# Patient Record
Sex: FEMALE | Race: BLACK OR AFRICAN AMERICAN | NOT HISPANIC OR LATINO | Employment: FULL TIME | URBAN - METROPOLITAN AREA
[De-identification: names, ages, dates, MRNs, and addresses within clinical notes are randomized per-mention and may not be internally consistent; named-entity substitution may affect disease eponyms.]

---

## 2017-01-04 ENCOUNTER — LAB VISIT (OUTPATIENT)
Dept: LAB | Facility: HOSPITAL | Age: 32
End: 2017-01-04
Attending: OBSTETRICS & GYNECOLOGY
Payer: MEDICAID

## 2017-01-04 ENCOUNTER — ROUTINE PRENATAL (OUTPATIENT)
Dept: OBSTETRICS AND GYNECOLOGY | Facility: CLINIC | Age: 32
End: 2017-01-04
Payer: MEDICAID

## 2017-01-04 VITALS — BODY MASS INDEX: 42.13 KG/M2 | SYSTOLIC BLOOD PRESSURE: 140 MMHG | DIASTOLIC BLOOD PRESSURE: 102 MMHG | WEIGHT: 269 LBS

## 2017-01-04 DIAGNOSIS — Z3A.17 PREGNANCY WITH 17 COMPLETED WEEKS GESTATION: Primary | ICD-10-CM

## 2017-01-04 DIAGNOSIS — Z3A.17 PREGNANCY WITH 17 COMPLETED WEEKS GESTATION: ICD-10-CM

## 2017-01-04 PROCEDURE — 99999 PR PBB SHADOW E&M-EST. PATIENT-LVL I: CPT | Mod: PBBFAC,,, | Performed by: OBSTETRICS & GYNECOLOGY

## 2017-01-04 PROCEDURE — 99213 OFFICE O/P EST LOW 20 MIN: CPT | Mod: TH,S$PBB,, | Performed by: OBSTETRICS & GYNECOLOGY

## 2017-01-04 PROCEDURE — 99211 OFF/OP EST MAY X REQ PHY/QHP: CPT | Mod: PBBFAC,PN | Performed by: OBSTETRICS & GYNECOLOGY

## 2017-01-04 PROCEDURE — 81511 FTL CGEN ABNOR FOUR ANAL: CPT | Mod: PO

## 2017-01-04 PROCEDURE — 36415 COLL VENOUS BLD VENIPUNCTURE: CPT | Mod: PO

## 2017-01-04 RX ORDER — METRONIDAZOLE 500 MG/1
500 TABLET ORAL 2 TIMES DAILY
Qty: 14 TABLET | Refills: 0 | Status: SHIPPED | OUTPATIENT
Start: 2017-01-04 | End: 2017-01-11

## 2017-01-04 NOTE — PROGRESS NOTES
Has a cold, taking robitussin  Wants quad screen  Has appt to see MFM 1/18  fht's 140-50's  81mg ASA qd  rtc 1 wk for bp check  OTC meds discussed, avoid sudafed  rx for flagyl sent in

## 2017-01-06 LAB
ALPHA FETOPROTEIN MATERNAL: 30.1 NG/ML
DOWN RISK (<1:270): NORMAL
ETHNIC ORIGIN: NORMAL
GA METHOD: NORMAL
GESTATIONAL AGE (DAYS): 2
GESTATIONAL AGE (WEEKS): 17
HUMAN CHORIONIC GONADOTROPIN: 65.5 IU/ML
INHIBIN A: 136 PG/ML
INSULIN DEPEND. DIABETES: NORMAL
M.O.M. ALPHA FETOPROTEIN: 1.04
M.O.M. HCG: 2.93
M.O.M. INHIBIN A: 1.17
M.O.M. UNCONJ. ESTRIOL: 1.16
MATERNAL AGE AT EDD (YRS): 30
MATERNAL AGE FOR DOWN: NORMAL
MATERNAL WEIGHT (LBS): 269
MULTIPLE GESTATIONS: NORMAL
QUAD SCREEN INTERPRETATION: NORMAL
QUAD SCREEN: NEGATIVE
TRISOMY 18 (<1:100): NORMAL
UNCONJUGATED ESTRIOL: 1.07 NG/ML

## 2017-01-11 ENCOUNTER — ROUTINE PRENATAL (OUTPATIENT)
Dept: OBSTETRICS AND GYNECOLOGY | Facility: CLINIC | Age: 32
End: 2017-01-11
Payer: MEDICAID

## 2017-01-11 VITALS — BODY MASS INDEX: 42.13 KG/M2 | SYSTOLIC BLOOD PRESSURE: 126 MMHG | WEIGHT: 269 LBS | DIASTOLIC BLOOD PRESSURE: 82 MMHG

## 2017-01-11 DIAGNOSIS — Z3A.18 18 WEEKS GESTATION OF PREGNANCY: Primary | ICD-10-CM

## 2017-01-11 DIAGNOSIS — O16.2 HTN COMPLICATING PERIPREGNANCY, ANTEPARTUM, SECOND TRIMESTER: ICD-10-CM

## 2017-01-11 PROBLEM — O16.9 HTN COMPLICATING PERIPREGNANCY, ANTEPARTUM: Status: ACTIVE | Noted: 2017-01-11

## 2017-01-11 PROCEDURE — 99212 OFFICE O/P EST SF 10 MIN: CPT | Mod: PBBFAC,PN | Performed by: OBSTETRICS & GYNECOLOGY

## 2017-01-11 PROCEDURE — 99999 PR PBB SHADOW E&M-EST. PATIENT-LVL II: CPT | Mod: PBBFAC,,, | Performed by: OBSTETRICS & GYNECOLOGY

## 2017-01-11 PROCEDURE — 99213 OFFICE O/P EST LOW 20 MIN: CPT | Mod: TH,S$PBB,, | Performed by: OBSTETRICS & GYNECOLOGY

## 2017-01-11 NOTE — PROGRESS NOTES
Doing ok today, BP good, no complaints  Quad screen nml  Seeing MFM in 3 wks  rtc 2wks with u/s 1st

## 2017-01-25 ENCOUNTER — TELEPHONE (OUTPATIENT)
Dept: OBSTETRICS AND GYNECOLOGY | Facility: CLINIC | Age: 32
End: 2017-01-25

## 2017-01-25 ENCOUNTER — HOSPITAL ENCOUNTER (OUTPATIENT)
Dept: RADIOLOGY | Facility: HOSPITAL | Age: 32
Discharge: HOME | End: 2017-01-25
Attending: OBSTETRICS & GYNECOLOGY
Payer: MEDICAID

## 2017-01-25 ENCOUNTER — ROUTINE PRENATAL (OUTPATIENT)
Dept: OBSTETRICS AND GYNECOLOGY | Facility: CLINIC | Age: 32
End: 2017-01-25
Payer: MEDICAID

## 2017-01-25 VITALS
WEIGHT: 270.19 LBS | BODY MASS INDEX: 42.32 KG/M2 | DIASTOLIC BLOOD PRESSURE: 78 MMHG | SYSTOLIC BLOOD PRESSURE: 108 MMHG

## 2017-01-25 DIAGNOSIS — Z3A.20 20 WEEKS GESTATION OF PREGNANCY: Primary | ICD-10-CM

## 2017-01-25 PROCEDURE — 76805 OB US >/= 14 WKS SNGL FETUS: CPT | Mod: TC,PO

## 2017-01-25 PROCEDURE — 99212 OFFICE O/P EST SF 10 MIN: CPT | Mod: PBBFAC,PN | Performed by: OBSTETRICS & GYNECOLOGY

## 2017-01-25 PROCEDURE — 99213 OFFICE O/P EST LOW 20 MIN: CPT | Mod: TH,S$PBB,, | Performed by: OBSTETRICS & GYNECOLOGY

## 2017-01-25 PROCEDURE — 99999 PR PBB SHADOW E&M-EST. PATIENT-LVL II: CPT | Mod: PBBFAC,,, | Performed by: OBSTETRICS & GYNECOLOGY

## 2017-02-01 ENCOUNTER — OFFICE VISIT (OUTPATIENT)
Dept: MATERNAL FETAL MEDICINE | Facility: HOSPITAL | Age: 32
End: 2017-02-01
Payer: MEDICAID

## 2017-02-01 VITALS
BODY MASS INDEX: 42.22 KG/M2 | HEIGHT: 67 IN | DIASTOLIC BLOOD PRESSURE: 80 MMHG | SYSTOLIC BLOOD PRESSURE: 140 MMHG | WEIGHT: 269 LBS

## 2017-02-01 DIAGNOSIS — O99.212 MATERNAL MORBID OBESITY, ANTEPARTUM, SECOND TRIMESTER: ICD-10-CM

## 2017-02-01 DIAGNOSIS — O10.919 HTN IN PREGNANCY, CHRONIC: Primary | ICD-10-CM

## 2017-02-01 DIAGNOSIS — E66.01 MATERNAL MORBID OBESITY, ANTEPARTUM, SECOND TRIMESTER: ICD-10-CM

## 2017-02-01 DIAGNOSIS — O16.2 HTN COMPLICATING PERIPREGNANCY, ANTEPARTUM, SECOND TRIMESTER: ICD-10-CM

## 2017-02-01 DIAGNOSIS — O16.9 HTN COMPLICATING PERIPREGNANCY, ANTEPARTUM, UNSPECIFIED TRIMESTER: ICD-10-CM

## 2017-02-01 DIAGNOSIS — O99.891 CURRENT MATERNAL CONDITION AFFECTING PREGNANCY: ICD-10-CM

## 2017-02-01 DIAGNOSIS — Z36.3 ENCOUNTER FOR ROUTINE SCREENING FOR MALFORMATION USING ULTRASONICS: ICD-10-CM

## 2017-02-01 PROCEDURE — 99202 OFFICE O/P NEW SF 15 MIN: CPT | Mod: 25,TH,, | Performed by: OBSTETRICS & GYNECOLOGY

## 2017-02-01 PROCEDURE — 76811 OB US DETAILED SNGL FETUS: CPT

## 2017-02-01 PROCEDURE — 76811 OB US DETAILED SNGL FETUS: CPT | Mod: 26,,, | Performed by: OBSTETRICS & GYNECOLOGY

## 2017-02-01 RX ORDER — NAPROXEN SODIUM 220 MG/1
81 TABLET, FILM COATED ORAL DAILY
COMMUNITY
End: 2017-06-09

## 2017-02-01 NOTE — PROGRESS NOTES
Chief complaint: ? Hypertension in pregnancy    Provider requesting consultation: Davis Timbo    31 y.o.  at 21w 2d gestation     Wt 269 lbs (BMI 42)  /80 mmhg    Prenatal visits with Dr. Izquierdo prior to 20 wks gestation, she had at least 2 BP's that met the criteria for HTN:      BPs since first visit to 20w 2d were as follows: 146/96, 140/80, 114/82, 140/82, 126/82, 108/78    Labs:  2016: Cr 0.6  2016: 24 hr urine for protein      Inadequate volume of 800cc      Protein 64mg    Ob hx:  1. , term, male, 8 lbs 9oz, LTCS  2. , SAB  3. , EAB    Quad screen negative     Meds:  ASA 81 mg daily    Social history: reports that she has never smoked. She has never used smokeless tobacco. She reports that she drinks alcohol. She reports that she does not use illicit drugs.    Detailed/Targeted fetal anatomical survey - ultrasound: see report for details   Some views are suboptimal secondary to fetal positioning and decreased resolution however no obvious abnormalities are detected   Reassuring fetal growth  Normal amniotic fluid volume per qualitative assessment  Normal placental location without evidence of previa  Normal appearing cervical length per trans-abdominal screening    ----------technically difficul sono--------------    Counseling:  Patient counseled that based on her above mentioned prenatal visit blood pressures, she has the diagnosis of CHTN. Therefore, I counseled patient on CHTN in pregnancy as noted below.     Counseling:    Hypertension in pregnancy counseling and recommendations:  Today I counseled the patient on maternal/fetal risks associated with CHTN during pregnancy including fetal growth restriction, miscarriage,  delivery, development of superimposed preeclampsia, and eclampsia. She was counseled on the recommendations for blood pressure control, serial ultrasound for fetal growth assessment, and timing of delivery. I also counseled her on the  recommendation for aspirin 81 mg daily which may decrease her risk of developing superimposed preeclampsia.  Patient is already taking ASA 81mg po qday.     I spent about 20 minutes in consultation separate from ultrasound    Recommendations:  1. We will schedule patient for a f/u sono with MFM in 4-5 wks for f/u anatomy, interval growth, and amniotic fluid volume assessment     2. HTN in pregnancy: [facility] recommendations   - Because of the increased risk of preeclampsia in patients with underlying chronic hypertension we recommend starting aspirin 81mg daily beginning in the late first trimester.           -patient already taking ASA     -Because of the increased risk of preeclampsia in patients with chronic hypertension we recommend obtaining a baseline 24 hour urine protein evaluation or a baseline urine protein to creatinine ratio.             -patient had a 24 hr urine on 11- with a volume of 800 (mildly inadequate). Consider obtaining a Protein/Creatinine ratio for a baseline     -Secondary to the higher incidence of intrauterine growth restriction (IUGR) in patients with chronic hypertension, we recommend periodic (every 4-6 weeks) fetal growth assessments.               -We will be seeing patient in 4-5 wks for a f/u sono to complete fetal anatomical survey.     -Continued close observation of patient's blood pressures.  Care should be taken also to avoid hypotension in pregnancy as this can be associated with uteroplacental insufficiency.  Currently we recommend:   -For women with a systolic BP less than 160mmHg or a diastolic BP less than 105mmHg, and no evidence of end organ damage, no medication treatment needed   -For women with a systolic BP persistently greater than or equal to 160mmHg or a diastolic BP greater than or equal to 105mmHg, antihypertensive medication is                     recommended with goal to be between 120-160mmHg systolic and 80-105mmHg diastolic.     -We also  recommend fetal surveillance for women with chronic hypertension that are requiring medications.  We recommend twice weekly fetal NSTs with once weekly MVP starting at 32 weeks until delivery.    -In regards to timing of delivery we recommend to follow the guidelines from ACOG Committee Opinion Number 560 from April of 2013.  The committee opinion recommends to consider delivery in the following:    -Women on no medications: at 38 0/7 - 39 6/7 weeks EGA  -Women that are well controlled on medications: at 37 0/7 - 39 6/7 weeks EGA  -Women that are difficult to control on medications: at 36 0/7 - 37 6/7 weeks EGA.    Please do note hesitate to contact me if you have any questions.

## 2017-02-20 ENCOUNTER — LAB VISIT (OUTPATIENT)
Dept: LAB | Facility: HOSPITAL | Age: 32
End: 2017-02-20
Attending: OBSTETRICS & GYNECOLOGY
Payer: MEDICAID

## 2017-02-20 DIAGNOSIS — Z3A.20 20 WEEKS GESTATION OF PREGNANCY: ICD-10-CM

## 2017-02-20 LAB — GLUCOSE SERPL-MCNC: 99 MG/DL

## 2017-02-20 PROCEDURE — 82950 GLUCOSE TEST: CPT | Mod: PO

## 2017-02-20 PROCEDURE — 36415 COLL VENOUS BLD VENIPUNCTURE: CPT | Mod: PO

## 2017-02-22 ENCOUNTER — ROUTINE PRENATAL (OUTPATIENT)
Dept: OBSTETRICS AND GYNECOLOGY | Facility: CLINIC | Age: 32
End: 2017-02-22
Payer: MEDICAID

## 2017-02-22 VITALS — BODY MASS INDEX: 43.23 KG/M2 | SYSTOLIC BLOOD PRESSURE: 128 MMHG | WEIGHT: 276 LBS | DIASTOLIC BLOOD PRESSURE: 74 MMHG

## 2017-02-22 PROCEDURE — 99213 OFFICE O/P EST LOW 20 MIN: CPT | Mod: TH,S$PBB,, | Performed by: OBSTETRICS & GYNECOLOGY

## 2017-02-22 PROCEDURE — 99999 PR PBB SHADOW E&M-EST. PATIENT-LVL I: CPT | Mod: PBBFAC,,, | Performed by: OBSTETRICS & GYNECOLOGY

## 2017-02-22 PROCEDURE — 99211 OFF/OP EST MAY X REQ PHY/QHP: CPT | Mod: PBBFAC,PN | Performed by: OBSTETRICS & GYNECOLOGY

## 2017-03-08 ENCOUNTER — OFFICE VISIT (OUTPATIENT)
Dept: MATERNAL FETAL MEDICINE | Facility: HOSPITAL | Age: 32
End: 2017-03-08
Payer: MEDICAID

## 2017-03-08 DIAGNOSIS — O10.919 HTN IN PREGNANCY, CHRONIC: ICD-10-CM

## 2017-03-08 DIAGNOSIS — Z36.4 ANTENATAL SCREENING FOR FETAL GROWTH RETARDATION USING ULTRASONICS: ICD-10-CM

## 2017-03-08 DIAGNOSIS — O99.891 CURRENT MATERNAL CONDITION AFFECTING PREGNANCY: ICD-10-CM

## 2017-03-08 DIAGNOSIS — O16.2 HTN COMPLICATING PERIPREGNANCY, ANTEPARTUM, SECOND TRIMESTER: ICD-10-CM

## 2017-03-08 PROCEDURE — 76816 OB US FOLLOW-UP PER FETUS: CPT | Mod: 26,,, | Performed by: OBSTETRICS & GYNECOLOGY

## 2017-03-08 PROCEDURE — 76816 OB US FOLLOW-UP PER FETUS: CPT

## 2017-03-08 PROCEDURE — 99499 UNLISTED E&M SERVICE: CPT | Mod: ,,, | Performed by: OBSTETRICS & GYNECOLOGY

## 2017-03-09 ENCOUNTER — TELEPHONE (OUTPATIENT)
Dept: MATERNAL FETAL MEDICINE | Facility: CLINIC | Age: 32
End: 2017-03-09

## 2017-03-09 DIAGNOSIS — O99.210 OBESITY AFFECTING PREGNANCY: Primary | ICD-10-CM

## 2017-03-09 NOTE — TELEPHONE ENCOUNTER
----- Message from Meliza Easley LPN sent at 3/8/2017 10:42 AM CST -----  Please schedule pt for 4 week follow up with [facility]alo and Peds cards for the same day.     Thank you.       Scheduled.  Left patient a message to return my call at [phone].

## 2017-03-12 NOTE — PROGRESS NOTES
"Indication  ========    CHTN on ASA, BMI 43: f/u anatomy, interval growth.    History  ======    General History  Height 170 cm  Height (ft) 5 ft  Height (in) 7 in  Risk Factors  History risk factors: Personal history of hypertension    Pregnancy History  ==============    Maternal Lab Tests  Test: Quad/ Penta Screen  Date: 1/4/2017  Result: negative  Wants to know gender: yes    Maternal Assessment  =================    Weight 123 kg  Weight (lb) 272 lb  Height 170 cm  Height (ft) 5 ft  Height (in) 7 in  BP syst 130 mmHg  BP diast 70 mmHg  BMI 42.60 kg/m²    Method  ======    Transabdominal ultrasound examination. View: Good view.    Pregnancy  =========    Goins pregnancy. Number of fetuses: 1.    Dating  ======    GA by "stated dating" 26 w + 2 d  RAUL by "stated dating": 6/12/2017  Ultrasound examination on: 3/8/2017  GA by U/S based upon: AC, BPD, Femur, HC  GA by U/S 27 w + 2 d  RAUL by U/S: 6/5/2017  Assigned: The Best Overall Assessment is based on the stated EDC.  Assigned GA 26 w + 2 d  Assigned RAUL: 6/12/2017    General Evaluation  ==============    Cardiac activity: present.  bpm.  Fetal movements: visualized.  Presentation: cephalic.  Placenta:  Placental site: posterior.  Umbilical cord: Cord vessels: 3 vessel cord.  Amniotic fluid: MVP 7.1 cm.    Biophysical Profile  ==============    2: Amniotic fluid volume    Fetal Biometry  ============    Fetal Biometry  BPD 69.4 mm 88% 27w 6d Hadlock  .8 mm 78% 27w 6d Hadlock  .6 mm 45% 26w 3d Hadlock  Femur 49.7 mm 51% 26w 5d Hadlock  Cerebellum tr 29.8 mm 60% 27w 2d Lopez  CM 6.5 mm 54% Nicolaides   g 58% 26w 3d Hadlock  Calculated by: Hadlock (BPD-HC-AC-FL)  EFW (lb) 2 lb  EFW (oz) 3 oz  HC / AC 1.17  FL / BPD 0.72  FL / AC 0.23  MVP 7.1 cm   bpm    Fetal Anatomy  ===========    Cranium: normal  Midline falx: normal  Cavum septi pellucidi: normal  Cerebellum: normal  Cisterna magna: normal  Lt lateral " ventricle: normal  Neck: suboptimal  Lips: documented previously  Profile: normal  Nose: documented previously  4-chamber view: normal  RVOT: normal  LVOT: normal  Heart / Thorax: Septum normal  Situs: normal  Aortic arch: suboptimal  Ductal arch: suboptimal  Diaphragm: normal  Cord insertion: normal  Stomach: normal  Kidneys: normal  Bladder: normal  Genitals: normal  Cervical spine: normal  Thoracic spine: normal  Lumbar spine: normal  Sacral spine: normal  Rt arm: documented previously  Lt arm: documented previously  Rt hand: sub opt today prev seen in resting position  Lt hand: documented previously  Rt leg: documented previously  Lt leg: documented previously  Rt foot: documented previously  Lt foot: documented previously  Gender: male  Wants to know gender: yes    Consultation  ==========    31 y.o.  at 26w 2d gestation    Wt 272 lbs (BMI 42)  /70 mmhg    Prenatal visits with Dr. Izquierdo prior to 20 wks gestation, she had at least 2 BP's that met the criteria for HTN:  BPs since first visit to 20w 2d were as follows: 146/96, 140/80, 114/82, 140/82, 126/82, 108/78    Labs:  2016: Cr 0.6  2016: 24 hr urine for protein  Inadequate volume of 800cc  Protein 64mg    Ob hx:  1. , term, male, 8 lbs 9oz, LTCS  2. , SAB  3. , EAB    Quad screen negative    Meds:  ASA 81 mg daily    Social history: reports that she has never smoked. She has never used smokeless tobacco. She reports that she drinks alcohol. She reports  that she does not use illicit drugs.    Today, patient without complaints  ultrasound performed: see report for details  Patient counseled on today's ultrasound and recommendations for f/u sono with MFM in 4 wks for fetal growth, and referral to Peds Cards for  fetal echocardiogram secondary to technically difficult sono (BMI 43).    Impression  =========    Normal fetal anatomy with no obvious abnormalities: see above for details of those organs  suboptimum  Reassuring fetal growth  Normal amniotic fluid volume (MVP 7.1cm)  Normal placental location without evidence of previa  .    Recommendation  ==============    1. We will schedule patient for a f/u sono with MFM in 4 wks for interval fetal growth, MVP, and overall fetal well being    2. We will schedule patient for fetal echocardiogram with Peds Cards secondary to technically difficult sono (BMI 43)    3. Refer to prior sono/consult on 02- for prior recommendations pertaining to CHTN .

## 2017-03-15 ENCOUNTER — ROUTINE PRENATAL (OUTPATIENT)
Dept: OBSTETRICS AND GYNECOLOGY | Facility: CLINIC | Age: 32
End: 2017-03-15
Payer: MEDICAID

## 2017-03-15 VITALS — WEIGHT: 279 LBS | BODY MASS INDEX: 43.7 KG/M2 | SYSTOLIC BLOOD PRESSURE: 130 MMHG | DIASTOLIC BLOOD PRESSURE: 84 MMHG

## 2017-03-15 DIAGNOSIS — Z3A.27 27 WEEKS GESTATION OF PREGNANCY: Primary | ICD-10-CM

## 2017-03-15 PROCEDURE — 99211 OFF/OP EST MAY X REQ PHY/QHP: CPT | Mod: PBBFAC,PN | Performed by: OBSTETRICS & GYNECOLOGY

## 2017-03-15 PROCEDURE — 99213 OFFICE O/P EST LOW 20 MIN: CPT | Mod: TH,S$PBB,, | Performed by: OBSTETRICS & GYNECOLOGY

## 2017-03-15 PROCEDURE — 99999 PR PBB SHADOW E&M-EST. PATIENT-LVL I: CPT | Mod: PBBFAC,,, | Performed by: OBSTETRICS & GYNECOLOGY

## 2017-03-31 ENCOUNTER — LAB VISIT (OUTPATIENT)
Dept: LAB | Facility: HOSPITAL | Age: 32
End: 2017-03-31
Attending: OBSTETRICS & GYNECOLOGY
Payer: MEDICAID

## 2017-03-31 DIAGNOSIS — Z3A.27 27 WEEKS GESTATION OF PREGNANCY: ICD-10-CM

## 2017-03-31 LAB — GLUCOSE SERPL-MCNC: 121 MG/DL

## 2017-03-31 PROCEDURE — 82950 GLUCOSE TEST: CPT | Mod: PO

## 2017-03-31 PROCEDURE — 36415 COLL VENOUS BLD VENIPUNCTURE: CPT | Mod: PO

## 2017-04-06 ENCOUNTER — OFFICE VISIT (OUTPATIENT)
Dept: MATERNAL FETAL MEDICINE | Facility: CLINIC | Age: 32
End: 2017-04-06
Attending: OBSTETRICS & GYNECOLOGY
Payer: MEDICAID

## 2017-04-06 ENCOUNTER — OFFICE VISIT (OUTPATIENT)
Dept: PEDIATRIC CARDIOLOGY | Facility: CLINIC | Age: 32
End: 2017-04-06
Attending: PEDIATRICS
Payer: MEDICAID

## 2017-04-06 VITALS
HEIGHT: 67 IN | BODY MASS INDEX: 43.95 KG/M2 | SYSTOLIC BLOOD PRESSURE: 116 MMHG | DIASTOLIC BLOOD PRESSURE: 81 MMHG | WEIGHT: 280 LBS | HEART RATE: 93 BPM

## 2017-04-06 DIAGNOSIS — O99.210 OBESITY AFFECTING PREGNANCY: ICD-10-CM

## 2017-04-06 DIAGNOSIS — O10.919 HTN IN PREGNANCY, CHRONIC: ICD-10-CM

## 2017-04-06 DIAGNOSIS — O16.3 HTN COMPLICATING PERIPREGNANCY, ANTEPARTUM, THIRD TRIMESTER: Primary | ICD-10-CM

## 2017-04-06 PROCEDURE — 99499 UNLISTED E&M SERVICE: CPT | Mod: S$PBB,,, | Performed by: OBSTETRICS & GYNECOLOGY

## 2017-04-06 PROCEDURE — 76825 ECHO EXAM OF FETAL HEART: CPT | Mod: PBBFAC | Performed by: PEDIATRICS

## 2017-04-06 PROCEDURE — 76827 ECHO EXAM OF FETAL HEART: CPT | Mod: PBBFAC | Performed by: PEDIATRICS

## 2017-04-06 PROCEDURE — 76816 OB US FOLLOW-UP PER FETUS: CPT | Mod: 26,S$PBB,, | Performed by: OBSTETRICS & GYNECOLOGY

## 2017-04-06 PROCEDURE — 76819 FETAL BIOPHYS PROFIL W/O NST: CPT | Mod: 26,S$PBB,, | Performed by: OBSTETRICS & GYNECOLOGY

## 2017-04-06 PROCEDURE — 76819 FETAL BIOPHYS PROFIL W/O NST: CPT | Mod: PBBFAC | Performed by: OBSTETRICS & GYNECOLOGY

## 2017-04-06 PROCEDURE — 76816 OB US FOLLOW-UP PER FETUS: CPT | Mod: PBBFAC | Performed by: OBSTETRICS & GYNECOLOGY

## 2017-04-06 PROCEDURE — 76825 ECHO EXAM OF FETAL HEART: CPT | Mod: 26,S$PBB,, | Performed by: PEDIATRICS

## 2017-04-06 PROCEDURE — 76827 ECHO EXAM OF FETAL HEART: CPT | Mod: 26,S$PBB,, | Performed by: PEDIATRICS

## 2017-04-06 PROCEDURE — 99203 OFFICE O/P NEW LOW 30 MIN: CPT | Mod: 25,S$PBB,, | Performed by: PEDIATRICS

## 2017-04-06 PROCEDURE — 93325 DOPPLER ECHO COLOR FLOW MAPG: CPT | Mod: 26,S$PBB,, | Performed by: PEDIATRICS

## 2017-04-06 PROCEDURE — 99999 PR PBB SHADOW E&M-EST. PATIENT-LVL III: CPT | Mod: PBBFAC,,, | Performed by: PEDIATRICS

## 2017-04-06 PROCEDURE — 93325 DOPPLER ECHO COLOR FLOW MAPG: CPT | Mod: PBBFAC | Performed by: PEDIATRICS

## 2017-04-06 NOTE — LETTER
April 7, 2017      Yoli Sánchez MD  [address]  [facility] [address]           [facility]  [address]  [facility] [address]  Phone: [phone]  Fax: [phone]          Patient: Joseph Duenas   MR Number: [id number]   YOB: 1985   Date of Visit: 4/6/2017       Dear Dr. Yoli Sánchez:    Thank you for referring Joseph Duenas to me for evaluation. Attached you will find relevant portions of my assessment and plan of care.    If you have questions, please do not hesitate to call me. I look forward to following Joseph Duenas along with you.    Sincerely,    Mateo Gutierrez MD    Enclosure  CC:  No Recipients    If you would like to receive this communication electronically, please contact [email or web address] or [phone] to request more information on [employer] Link access.    For providers and/or their staff who would like to refer a patient to [facility], please contact us through our one-stop-shop provider referral line, [facility], at [phone].    If you feel you have received this communication in error or would no longer like to receive these types of communications, please e-mail [email or web address]

## 2017-04-06 NOTE — PROGRESS NOTES
See ultrasound report for details    BP stable without meds  Fetal growth and surveillance is reassuring,   Normal Amniotic fluid volume

## 2017-04-06 NOTE — LETTER
April 6, 2017      Yoli Sánchez MD  [address]  [facility] [address]           [facility]  [address]  [place] [address]  Phone: [phone]          Patient: Joseph Duenas   MR Number: [id number]   YOB: 1985   Date of Visit: 4/6/2017       Dear Dr. Yoli Sánchez:    Thank you for referring Joseph Duenas to me for evaluation. Attached you will find relevant portions of my assessment and plan of care.    If you have questions, please do not hesitate to call me. I look forward to following Joseph Duenas along with you.    Sincerely,    Anders Qiu III, MD    Enclosure  CC:  No Recipients    If you would like to receive this communication electronically, please contact [email or web address] or [phone] to request more information on [employer] Link access.    For providers and/or their staff who would like to refer a patient to [facility], please contact us through our one-stop-shop provider referral line, [facility], at [phone].    If you feel you have received this communication in error or would no longer like to receive these types of communications, please e-mail [email or web address]

## 2017-04-07 NOTE — PROGRESS NOTES
"Ms. Duenas  is a 31 y.o. year old  , referred by  because of difficult imaging of the fetal heart.    The patient presented at approximately 30 3/7 weeks gestation (Patient's last menstrual period was 2016 (approximate).).  The patient denied any complaints.    Past medical history: Significant for hypertension (currently off medication).  Past surgical history: S/P C/S x 1..  Past gestational history:   1. , term, male, 8 lbs 9oz, LTCS  2. , SAB  3. , EAB    Family history: Negative for congenital heart disease, and sudden death during childhood.    Medications:   Outpatient Encounter Prescriptions as of 2017   Medication Sig Dispense Refill    aspirin 81 MG Chew Take 81 mg by mouth once daily.       No facility-administered encounter medications on file as of 2017.        Allergies: Review of patient's allergies indicates no known allergies.    Blood pressure 116/81, pulse 93, height 5' 7.01" (1.702 m), weight 127 kg (279 lb 15.8 oz), last menstrual period 2016.    Fetal echocardiogram was technically difficult due to advanced gestation, patient size and fetal position.  A four chamber fetal heart with situs solitus was seen.  The ventricles appeared to be equal in size.  The contractility of both ventricles was good.  The fetal heart rate was within the normal range, and regular.  The interventricular septum appeared to be intact.  There were normally related great arteries seen.  The ductal and aortic arch were visualized, and appeared to be widely patent.  There was no pleural or pericardial effusion seen.    Doppler analysis revealed a three vessel umbilical cord, with normal flow patterns, and velocities by Doppler.  There was a normal flow pattern seen in the ductus venosus.  There was evidence of normal systemic, and pulmonary venous return seen.  There were normal inflow patterns seen across the AV-valves, without significant insufficiency.  There was no " ventricular level shunt seen.  The right and left ventricular outflow tract, and ductal and aortic arch appeared to be unobstructed.    Impression:  Although this was a technically difficult study, it is our impression that Ms. Duenas had a normal fetal echocardiogram.  As you know, small defects, and coarctation of the aorta cannot always be ruled out on fetal echocardiogram.  We discussed our findings with the patient, reviewed our images, and answered her questions. We also discussed the limitations of fetal echocardiography, but emphasized that her child appears to have normal cardiac anatomy.  No further follow up is scheduled in our clinic, but, of course, we will always be available to reevaluate this patient, if needed.  Time spent: 30 minutes, 50% dedicated to counseling.

## 2017-04-10 ENCOUNTER — ROUTINE PRENATAL (OUTPATIENT)
Dept: OBSTETRICS AND GYNECOLOGY | Facility: CLINIC | Age: 32
End: 2017-04-10
Payer: MEDICAID

## 2017-04-10 VITALS — BODY MASS INDEX: 44 KG/M2 | SYSTOLIC BLOOD PRESSURE: 122 MMHG | WEIGHT: 281 LBS | DIASTOLIC BLOOD PRESSURE: 84 MMHG

## 2017-04-10 DIAGNOSIS — Z3A.31 31 WEEKS GESTATION OF PREGNANCY: Primary | ICD-10-CM

## 2017-04-10 PROCEDURE — 99213 OFFICE O/P EST LOW 20 MIN: CPT | Mod: TH,S$PBB,, | Performed by: OBSTETRICS & GYNECOLOGY

## 2017-04-10 PROCEDURE — 99211 OFF/OP EST MAY X REQ PHY/QHP: CPT | Mod: PBBFAC,PN | Performed by: OBSTETRICS & GYNECOLOGY

## 2017-04-10 PROCEDURE — 99999 PR PBB SHADOW E&M-EST. PATIENT-LVL I: CPT | Mod: PBBFAC,,, | Performed by: OBSTETRICS & GYNECOLOGY

## 2017-04-10 NOTE — PROGRESS NOTES
32 y/o AAF @31weeks here for f/u  Seen by pediatric card's- echo reveals normal fetal cardiac anatomy  BP within acceptably range today  Reports abd cramping which resolved on its own  FHT's 140's  Denies vaginal d/c, bleeding   Counseled on PTL, VB, ROM

## 2017-04-24 ENCOUNTER — ROUTINE PRENATAL (OUTPATIENT)
Dept: OBSTETRICS AND GYNECOLOGY | Facility: CLINIC | Age: 32
End: 2017-04-24
Payer: MEDICAID

## 2017-04-24 VITALS — SYSTOLIC BLOOD PRESSURE: 128 MMHG | WEIGHT: 283 LBS | BODY MASS INDEX: 44.31 KG/M2 | DIASTOLIC BLOOD PRESSURE: 86 MMHG

## 2017-04-24 DIAGNOSIS — Z3A.33 33 WEEKS GESTATION OF PREGNANCY: Primary | ICD-10-CM

## 2017-04-24 PROCEDURE — 99211 OFF/OP EST MAY X REQ PHY/QHP: CPT | Mod: PBBFAC,PN | Performed by: OBSTETRICS & GYNECOLOGY

## 2017-04-24 PROCEDURE — 99999 PR PBB SHADOW E&M-EST. PATIENT-LVL I: CPT | Mod: PBBFAC,,, | Performed by: OBSTETRICS & GYNECOLOGY

## 2017-04-24 PROCEDURE — 99213 OFFICE O/P EST LOW 20 MIN: CPT | Mod: TH,S$PBB,, | Performed by: OBSTETRICS & GYNECOLOGY

## 2017-05-03 ENCOUNTER — OFFICE VISIT (OUTPATIENT)
Dept: MATERNAL FETAL MEDICINE | Facility: HOSPITAL | Age: 32
End: 2017-05-03
Attending: OBSTETRICS & GYNECOLOGY
Payer: MEDICAID

## 2017-05-03 VITALS
BODY MASS INDEX: 44 KG/M2 | DIASTOLIC BLOOD PRESSURE: 70 MMHG | SYSTOLIC BLOOD PRESSURE: 136 MMHG | HEART RATE: 110 BPM | WEIGHT: 281 LBS

## 2017-05-03 DIAGNOSIS — O99.891 CURRENT MATERNAL CONDITION AFFECTING PREGNANCY: ICD-10-CM

## 2017-05-03 DIAGNOSIS — O10.919 HTN IN PREGNANCY, CHRONIC: ICD-10-CM

## 2017-05-03 DIAGNOSIS — Z36.4 ANTENATAL SCREENING FOR FETAL GROWTH RETARDATION USING ULTRASONICS: ICD-10-CM

## 2017-05-03 PROCEDURE — 76816 OB US FOLLOW-UP PER FETUS: CPT

## 2017-05-03 PROCEDURE — 76819 FETAL BIOPHYS PROFIL W/O NST: CPT | Mod: 26,S$PBB,, | Performed by: OBSTETRICS & GYNECOLOGY

## 2017-05-03 PROCEDURE — 99499 UNLISTED E&M SERVICE: CPT | Mod: ,,, | Performed by: OBSTETRICS & GYNECOLOGY

## 2017-05-03 PROCEDURE — 76816 OB US FOLLOW-UP PER FETUS: CPT | Mod: 26,S$PBB,, | Performed by: OBSTETRICS & GYNECOLOGY

## 2017-05-03 NOTE — PROGRESS NOTES
"Indication  ========    Evaluation of fetal growth, MVP and BPP without NST (Dr. Izquierdo).    History  ======    General History  Height 170 cm  Height (ft) 5 ft  Height (in) 7 in  Risk Factors  History risk factors: Personal history of hypertension    Pregnancy History  ==============    Maternal Lab Tests  Test: Quad/ Penta Screen  Date: 1/4/2017  Result: negative  Wants to know gender: yes    Maternal Assessment  =================    Weight 127 kg  Weight (lb) 281 lb  Height 170 cm  Height (ft) 5 ft  Height (in) 7 in  BP syst 136 mmHg  BP diast 70 mmHg  BMI 44.01 kg/m²    Method  ======    Transabdominal ultrasound examination. View: Good view.    Pregnancy  =========    Goins pregnancy. Number of fetuses: 1.    Dating  ======    Cycle: regular cycle  GA by "stated dating" 34 w + 2 d  RAUL by "stated dating": 6/12/2017  Ultrasound examination on: 5/3/2017  GA by U/S based upon: AC, BPD, Femur, HC  GA by U/S 35 w + 3 d  RAUL by U/S: 6/4/2017  Assigned: The Best Overall Assessment is based on the stated EDC.  Assigned GA 34 w + 2 d  Assigned RAUL: 6/12/2017    General Evaluation  ==============    Cardiac activity: present.  bpm.  Fetal movements: visualized.  Presentation: cephalic.  Placenta:  Placental site: posterior.  Umbilical cord: Cord vessels: 3 vessel cord.  Amniotic fluid: Amount of AF: normal amount. MVP 7.2 cm.    Biophysical Profile  ==============    2: Fetal breathing movements  2: Gross body movements  2: Fetal tone  2: Amniotic fluid volume  8/8: Biophysical profile score    Fetal Biometry  ============    Fetal Biometry  BPD 88.2 mm 35w 5d Hadlock  .7 mm 36w 2d Hadlock  .1 mm 36w 3d Hadlock  Femur 64.2 mm 33w 1d Hadlock  EFW 2,693 g 78% 35w 3d Hadlock  Calculated by: Hadlock (BPD-HC-AC-FL)  EFW (lb) 5 lb  EFW (oz) 15 oz  HC / AC 0.99  FL / BPD 0.73  FL / AC 0.20  MVP 7.2 cm   bpm    Fetal Anatomy  ===========    Cranium: normal  Lateral ventricles: documented " previously  Choroid plexus: documented previously  Midline falx: documented previously  Cavum septi pellucidi: documented previously  Cerebellum: documented previously  Cisterna magna: documented previously  Lips: normal  Profile: documented previously  Nose: normal  4-chamber view: documented previously  RVOT: documented previously  LVOT: documented previously  Heart / Thorax: septum is sub opt today prev seen wnl; Peds Cards fetal echocardiogram (04/06/17): technically difficult, wnl  Aortic arch: documented previously  Ductal arch: suboptimal  Diaphragm: normal  Cord insertion: documented previously  Stomach: normal  Kidneys: normal  Bladder: normal  Genitals: normal  Cervical spine: normal  Thoracic spine: normal  Lumbar spine: normal  Sacral spine: normal  Arms: documented previously  Legs: documented previously  Gender: male  Wants to know gender: yes    Impression  =========    1. 34w 2d lal pregnancy  2. Normal interval fetal growth (EFW = 2693 gms at 78%)  3. BPP without NST = 8/8  4. Amniotic fluid volume wnl (MVP 7.2cm)    Patient counseled on sono evaluation and recommendations .    Recommendation  ==============    1. We recommend a f/u sono in 4 wks for interval fetal growth, MVP, and BPP without NST  this sono will be performed in your office and reconsult MFM as needed    2. HTN in pregnancy  -Continued close observation of patient's blood pressures. Care should be taken also to avoid hypotension in pregnancy as this can be  associated with uteroplacental insufficiency. Currently we recommend:  -For women with a systolic BP less than 160mmHg or a diastolic BP less than 105mmHg, and no  evidence of end organ damage, no medication treatment needed    -For women with a systolic BP persistently greater than or equal to 160mmHg or a diastolic BP  greater than or equal to 105mmHg, antihypertensive medication is recommended with goal to be between  120-160mmHg systolic and 80-105mmHg diastolic.    -We  also recommend fetal surveillance for women with chronic hypertension that are requiring medications. We recommend twice weekly fetal  NSTs with once weekly MVP starting at 32 weeks until delivery.    -In regards to timing of delivery we recommend to follow the guidelines from ACOG Committee Opinion Number 560 from April of 2013. The  committee opinion recommends to consider delivery in the following:  -Women on no medications: at 38 0/7 - 39 6/7 weeks EGA  -Women that are well controlled on medications: at 37 0/7 - 39 6/7 weeks EGA  -Women that are difficult to control on medications: at 36 0/7 - 37 6/7 weeks EGA.    Please do note hesitate to contact me if you have any questions.

## 2017-05-03 NOTE — LETTER
May 3, 2017      Yoli Sánchez MD  [address]  [facility] [address]           [facility]  [address] [place] [address] First Floor Diagnostics  [place] [address]          Patient: Joseph Duenas   MR Number: [id number]   YOB: 1985   Date of Visit: 5/3/2017       Dear Dr. Yoli Sánchez:    Thank you for referring Joseph Duenas to me for evaluation. Attached you will find relevant portions of my assessment and plan of care.    If you have questions, please do not hesitate to call me. I look forward to following Joseph Duenas along with you.    Sincerely,    Yoli Sánchez MD    Enclosure  CC:  No Recipients    If you would like to receive this communication electronically, please contact [email or web address] or [phone] to request more information on [employer] Link access.    For providers and/or their staff who would like to refer a patient to [facility], please contact us through our one-stop-shop provider referral line, [facility] , at [phone].    If you feel you have received this communication in error or would no longer like to receive these types of communications, please e-mail [email or web address]

## 2017-05-10 ENCOUNTER — ROUTINE PRENATAL (OUTPATIENT)
Dept: OBSTETRICS AND GYNECOLOGY | Facility: CLINIC | Age: 32
End: 2017-05-10
Payer: MEDICAID

## 2017-05-10 VITALS — DIASTOLIC BLOOD PRESSURE: 74 MMHG | BODY MASS INDEX: 44.47 KG/M2 | WEIGHT: 284 LBS | SYSTOLIC BLOOD PRESSURE: 126 MMHG

## 2017-05-10 DIAGNOSIS — Z3A.35 35 WEEKS GESTATION OF PREGNANCY: Primary | ICD-10-CM

## 2017-05-10 PROCEDURE — 99212 OFFICE O/P EST SF 10 MIN: CPT | Mod: PBBFAC,PN | Performed by: OBSTETRICS & GYNECOLOGY

## 2017-05-10 PROCEDURE — 99999 PR PBB SHADOW E&M-EST. PATIENT-LVL II: CPT | Mod: PBBFAC,,, | Performed by: OBSTETRICS & GYNECOLOGY

## 2017-05-10 PROCEDURE — 99213 OFFICE O/P EST LOW 20 MIN: CPT | Mod: TH,S$PBB,, | Performed by: OBSTETRICS & GYNECOLOGY

## 2017-05-10 PROCEDURE — 87081 CULTURE SCREEN ONLY: CPT

## 2017-05-12 LAB — BACTERIA SPEC AEROBE CULT: NORMAL

## 2017-05-17 ENCOUNTER — ROUTINE PRENATAL (OUTPATIENT)
Dept: OBSTETRICS AND GYNECOLOGY | Facility: CLINIC | Age: 32
End: 2017-05-17
Payer: MEDICAID

## 2017-05-17 VITALS — DIASTOLIC BLOOD PRESSURE: 82 MMHG | BODY MASS INDEX: 45.25 KG/M2 | SYSTOLIC BLOOD PRESSURE: 124 MMHG | WEIGHT: 289 LBS

## 2017-05-17 DIAGNOSIS — Z3A.36 36 WEEKS GESTATION OF PREGNANCY: ICD-10-CM

## 2017-05-17 DIAGNOSIS — Z3A.39 39 WEEKS GESTATION OF PREGNANCY: Primary | ICD-10-CM

## 2017-05-17 PROCEDURE — 99213 OFFICE O/P EST LOW 20 MIN: CPT | Mod: TH,S$PBB,, | Performed by: OBSTETRICS & GYNECOLOGY

## 2017-05-17 PROCEDURE — 99213 OFFICE O/P EST LOW 20 MIN: CPT | Mod: PBBFAC,PN | Performed by: OBSTETRICS & GYNECOLOGY

## 2017-05-17 PROCEDURE — 99999 PR PBB SHADOW E&M-EST. PATIENT-LVL III: CPT | Mod: PBBFAC,,, | Performed by: OBSTETRICS & GYNECOLOGY

## 2017-05-17 RX ORDER — CARBOPROST TROMETHAMINE 250 UG/ML
250 INJECTION, SOLUTION INTRAMUSCULAR
Status: CANCELLED | OUTPATIENT
Start: 2017-05-17

## 2017-05-17 RX ORDER — MISOPROSTOL 100 UG/1
800 TABLET ORAL
Status: CANCELLED | OUTPATIENT
Start: 2017-05-17

## 2017-05-17 RX ORDER — METHYLERGONOVINE MALEATE 0.2 MG/ML
200 INJECTION INTRAVENOUS
Status: CANCELLED | OUTPATIENT
Start: 2017-05-17

## 2017-05-17 RX ORDER — SODIUM CHLORIDE, SODIUM LACTATE, POTASSIUM CHLORIDE, CALCIUM CHLORIDE 600; 310; 30; 20 MG/100ML; MG/100ML; MG/100ML; MG/100ML
INJECTION, SOLUTION INTRAVENOUS CONTINUOUS
Status: CANCELLED | OUTPATIENT
Start: 2017-05-17

## 2017-05-24 ENCOUNTER — ROUTINE PRENATAL (OUTPATIENT)
Dept: OBSTETRICS AND GYNECOLOGY | Facility: CLINIC | Age: 32
End: 2017-05-24
Payer: MEDICAID

## 2017-05-24 VITALS — WEIGHT: 292 LBS | DIASTOLIC BLOOD PRESSURE: 70 MMHG | BODY MASS INDEX: 45.72 KG/M2 | SYSTOLIC BLOOD PRESSURE: 122 MMHG

## 2017-05-24 DIAGNOSIS — Z3A.37 37 WEEKS GESTATION OF PREGNANCY: Primary | ICD-10-CM

## 2017-05-24 PROCEDURE — 99213 OFFICE O/P EST LOW 20 MIN: CPT | Mod: TH,S$PBB,, | Performed by: OBSTETRICS & GYNECOLOGY

## 2017-05-24 PROCEDURE — 99211 OFF/OP EST MAY X REQ PHY/QHP: CPT | Mod: PBBFAC,PN | Performed by: OBSTETRICS & GYNECOLOGY

## 2017-05-24 PROCEDURE — 99999 PR PBB SHADOW E&M-EST. PATIENT-LVL I: CPT | Mod: PBBFAC,,, | Performed by: OBSTETRICS & GYNECOLOGY

## 2017-05-30 ENCOUNTER — OFFICE VISIT (OUTPATIENT)
Dept: OBSTETRICS AND GYNECOLOGY | Facility: CLINIC | Age: 32
End: 2017-05-30
Payer: MEDICAID

## 2017-05-30 DIAGNOSIS — O99.213 OBESITY DURING PREGNANCY, ANTEPARTUM, THIRD TRIMESTER: ICD-10-CM

## 2017-05-30 DIAGNOSIS — O16.3 HYPERTENSION AFFECTING PREGNANCY, ANTEPARTUM, THIRD TRIMESTER: Primary | ICD-10-CM

## 2017-05-30 DIAGNOSIS — O10.919 HTN IN PREGNANCY, CHRONIC: ICD-10-CM

## 2017-05-30 DIAGNOSIS — O26.849 UTERINE SIZE-DATE DISCREPANCY, ANTEPARTUM, UNSPECIFIED TRIMESTER: ICD-10-CM

## 2017-05-30 PROCEDURE — 76816 OB US FOLLOW-UP PER FETUS: CPT | Mod: 26,S$PBB,, | Performed by: OBSTETRICS & GYNECOLOGY

## 2017-05-30 PROCEDURE — 76816 OB US FOLLOW-UP PER FETUS: CPT | Mod: PBBFAC,PO

## 2017-06-04 ENCOUNTER — ANESTHESIA (OUTPATIENT)
Dept: OBSTETRICS AND GYNECOLOGY | Facility: HOSPITAL | Age: 32
End: 2017-06-04
Payer: MEDICAID

## 2017-06-04 ENCOUNTER — SURGERY (OUTPATIENT)
Age: 32
End: 2017-06-04

## 2017-06-04 ENCOUNTER — ANESTHESIA EVENT (OUTPATIENT)
Dept: OBSTETRICS AND GYNECOLOGY | Facility: HOSPITAL | Age: 32
End: 2017-06-04
Payer: MEDICAID

## 2017-06-04 ENCOUNTER — HOSPITAL ENCOUNTER (INPATIENT)
Facility: HOSPITAL | Age: 32
LOS: 3 days | Discharge: HOME | End: 2017-06-07
Attending: OBSTETRICS & GYNECOLOGY | Admitting: OBSTETRICS & GYNECOLOGY
Payer: MEDICAID

## 2017-06-04 VITALS — HEART RATE: 107 BPM | DIASTOLIC BLOOD PRESSURE: 62 MMHG | SYSTOLIC BLOOD PRESSURE: 146 MMHG | OXYGEN SATURATION: 97 %

## 2017-06-04 DIAGNOSIS — Z3A.36 36 WEEKS GESTATION OF PREGNANCY: ICD-10-CM

## 2017-06-04 DIAGNOSIS — Z3A.39 39 WEEKS GESTATION OF PREGNANCY: ICD-10-CM

## 2017-06-04 LAB
ABO + RH BLD: NORMAL
ALLENS TEST: ABNORMAL
ALLENS TEST: ABNORMAL
BASOPHILS # BLD AUTO: 0 K/UL
BASOPHILS NFR BLD: 0 %
BILIRUB UR QL STRIP: NEGATIVE
BLD GP AB SCN CELLS X3 SERPL QL: NORMAL
CLARITY UR: CLEAR
COLOR UR: YELLOW
DIFFERENTIAL METHOD: ABNORMAL
EOSINOPHIL # BLD AUTO: 0.1 K/UL
EOSINOPHIL NFR BLD: 1 %
ERYTHROCYTE [DISTWIDTH] IN BLOOD BY AUTOMATED COUNT: 15.1 %
GLUCOSE UR QL STRIP: NEGATIVE
HCO3 UR-SCNC: 20.8 MMOL/L (ref 24–28)
HCO3 UR-SCNC: 24.9 MMOL/L (ref 24–28)
HCT VFR BLD AUTO: 31.8 %
HGB BLD-MCNC: 10 G/DL
HGB UR QL STRIP: ABNORMAL
KETONES UR QL STRIP: ABNORMAL
LEUKOCYTE ESTERASE UR QL STRIP: NEGATIVE
LYMPHOCYTES # BLD AUTO: 1.3 K/UL
LYMPHOCYTES NFR BLD: 12.2 %
MCH RBC QN AUTO: 26 PG
MCHC RBC AUTO-ENTMCNC: 31.4 %
MCV RBC AUTO: 83 FL
MONOCYTES # BLD AUTO: 0.8 K/UL
MONOCYTES NFR BLD: 7.5 %
NEUTROPHILS # BLD AUTO: 8.1 K/UL
NEUTROPHILS NFR BLD: 79 %
NITRITE UR QL STRIP: NEGATIVE
PCO2 BLDA: 47.2 MMHG (ref 35–45)
PCO2 BLDA: 68.4 MMHG (ref 35–45)
PH SMN: 7.17 [PH] (ref 7.35–7.45)
PH SMN: 7.25 [PH] (ref 7.35–7.45)
PH UR STRIP: 8 [PH] (ref 5–8)
PLATELET # BLD AUTO: 173 K/UL
PMV BLD AUTO: 12.1 FL
PO2 BLDA: 11 MMHG (ref 80–100)
PO2 BLDA: 20 MMHG (ref 80–100)
POC BE: -4 MMOL/L
POC BE: -6 MMOL/L
POC SATURATED O2: 24 % (ref 95–100)
POC SATURATED O2: 8 % (ref 95–100)
POC TCO2: 22 MMOL/L (ref 23–27)
POC TCO2: 27 MMOL/L (ref 23–27)
PROT UR QL STRIP: ABNORMAL
RBC # BLD AUTO: 3.84 M/UL
SAMPLE: ABNORMAL
SAMPLE: ABNORMAL
SITE: ABNORMAL
SITE: ABNORMAL
SP GR UR STRIP: 1.01 (ref 1–1.03)
URN SPEC COLLECT METH UR: ABNORMAL
UROBILINOGEN UR STRIP-ACNC: NEGATIVE EU/DL
WBC # BLD AUTO: 10.3 K/UL

## 2017-06-04 PROCEDURE — 63600175 PHARM REV CODE 636 W HCPCS

## 2017-06-04 PROCEDURE — 63600175 PHARM REV CODE 636 W HCPCS: Performed by: ANESTHESIOLOGY

## 2017-06-04 PROCEDURE — 25000003 PHARM REV CODE 250

## 2017-06-04 PROCEDURE — 37000008 HC ANESTHESIA 1ST 15 MINUTES: Performed by: OBSTETRICS & GYNECOLOGY

## 2017-06-04 PROCEDURE — 25000003 PHARM REV CODE 250: Performed by: OBSTETRICS & GYNECOLOGY

## 2017-06-04 PROCEDURE — 99238 HOSP IP/OBS DSCHRG MGMT 30/<: CPT | Mod: ,,, | Performed by: OBSTETRICS & GYNECOLOGY

## 2017-06-04 PROCEDURE — 25000003 PHARM REV CODE 250: Performed by: ANESTHESIOLOGY

## 2017-06-04 PROCEDURE — 88307 TISSUE EXAM BY PATHOLOGIST: CPT | Mod: 26,,, | Performed by: PATHOLOGY

## 2017-06-04 PROCEDURE — 86901 BLOOD TYPING SEROLOGIC RH(D): CPT

## 2017-06-04 PROCEDURE — 81003 URINALYSIS AUTO W/O SCOPE: CPT

## 2017-06-04 PROCEDURE — 99211 OFF/OP EST MAY X REQ PHY/QHP: CPT | Mod: 25

## 2017-06-04 PROCEDURE — 86900 BLOOD TYPING SEROLOGIC ABO: CPT

## 2017-06-04 PROCEDURE — 27800516 HC TRAY, EPIDURAL COMBO: Performed by: ANESTHESIOLOGY

## 2017-06-04 PROCEDURE — 86592 SYPHILIS TEST NON-TREP QUAL: CPT

## 2017-06-04 PROCEDURE — 37000009 HC ANESTHESIA EA ADD 15 MINS: Performed by: OBSTETRICS & GYNECOLOGY

## 2017-06-04 PROCEDURE — 36415 COLL VENOUS BLD VENIPUNCTURE: CPT

## 2017-06-04 PROCEDURE — 72100002 HC LABOR CARE, 1ST 8 HOURS

## 2017-06-04 PROCEDURE — 27200688 HC TRAY, SPINAL-HYPER/ ISOBARIC: Performed by: ANESTHESIOLOGY

## 2017-06-04 PROCEDURE — 11000001 HC ACUTE MED/SURG PRIVATE ROOM

## 2017-06-04 PROCEDURE — 36000685 HC CESAREAN SECTION LEVEL I

## 2017-06-04 PROCEDURE — 88307 TISSUE EXAM BY PATHOLOGIST: CPT | Performed by: PATHOLOGY

## 2017-06-04 PROCEDURE — 51702 INSERT TEMP BLADDER CATH: CPT

## 2017-06-04 PROCEDURE — 85025 COMPLETE CBC W/AUTO DIFF WBC: CPT

## 2017-06-04 PROCEDURE — 82800 BLOOD PH: CPT

## 2017-06-04 PROCEDURE — 59514 CESAREAN DELIVERY ONLY: CPT | Mod: GB,,, | Performed by: OBSTETRICS & GYNECOLOGY

## 2017-06-04 PROCEDURE — 36416 COLLJ CAPILLARY BLOOD SPEC: CPT

## 2017-06-04 RX ORDER — ADHESIVE BANDAGE
30 BANDAGE TOPICAL 2 TIMES DAILY PRN
Status: DISCONTINUED | OUTPATIENT
Start: 2017-06-05 | End: 2017-06-07 | Stop reason: HOSPADM

## 2017-06-04 RX ORDER — MISOPROSTOL 200 UG/1
800 TABLET ORAL
Status: DISCONTINUED | OUTPATIENT
Start: 2017-06-04 | End: 2017-06-07 | Stop reason: HOSPADM

## 2017-06-04 RX ORDER — HYDROCORTISONE 25 MG/G
CREAM TOPICAL 3 TIMES DAILY PRN
Status: DISCONTINUED | OUTPATIENT
Start: 2017-06-04 | End: 2017-06-07 | Stop reason: HOSPADM

## 2017-06-04 RX ORDER — SODIUM CITRATE AND CITRIC ACID MONOHYDRATE 334; 500 MG/5ML; MG/5ML
30 SOLUTION ORAL ONCE
Status: COMPLETED | OUTPATIENT
Start: 2017-06-04 | End: 2017-06-04

## 2017-06-04 RX ORDER — DOCUSATE SODIUM 100 MG/1
200 CAPSULE, LIQUID FILLED ORAL 2 TIMES DAILY
Status: DISCONTINUED | OUTPATIENT
Start: 2017-06-04 | End: 2017-06-07 | Stop reason: HOSPADM

## 2017-06-04 RX ORDER — SIMETHICONE 80 MG
1 TABLET,CHEWABLE ORAL EVERY 6 HOURS PRN
Status: DISCONTINUED | OUTPATIENT
Start: 2017-06-04 | End: 2017-06-07 | Stop reason: HOSPADM

## 2017-06-04 RX ORDER — AMOXICILLIN 250 MG
1 CAPSULE ORAL NIGHTLY PRN
Status: DISCONTINUED | OUTPATIENT
Start: 2017-06-04 | End: 2017-06-07 | Stop reason: HOSPADM

## 2017-06-04 RX ORDER — SODIUM CITRATE AND CITRIC ACID MONOHYDRATE 334; 500 MG/5ML; MG/5ML
SOLUTION ORAL
Status: COMPLETED
Start: 2017-06-04 | End: 2017-06-04

## 2017-06-04 RX ORDER — BISACODYL 10 MG
10 SUPPOSITORY, RECTAL RECTAL ONCE AS NEEDED
Status: ACTIVE | OUTPATIENT
Start: 2017-06-04 | End: 2017-06-04

## 2017-06-04 RX ORDER — CARBOPROST TROMETHAMINE 250 UG/ML
250 INJECTION, SOLUTION INTRAMUSCULAR
Status: DISCONTINUED | OUTPATIENT
Start: 2017-06-04 | End: 2017-06-07 | Stop reason: HOSPADM

## 2017-06-04 RX ORDER — DIPHENHYDRAMINE HCL 25 MG
25 CAPSULE ORAL EVERY 4 HOURS PRN
Status: DISCONTINUED | OUTPATIENT
Start: 2017-06-04 | End: 2017-06-07 | Stop reason: HOSPADM

## 2017-06-04 RX ORDER — KETOROLAC TROMETHAMINE 30 MG/ML
INJECTION, SOLUTION INTRAMUSCULAR; INTRAVENOUS
Status: DISCONTINUED | OUTPATIENT
Start: 2017-06-04 | End: 2017-06-04

## 2017-06-04 RX ORDER — OXYCODONE AND ACETAMINOPHEN 10; 325 MG/1; MG/1
1 TABLET ORAL EVERY 4 HOURS PRN
Status: DISCONTINUED | OUTPATIENT
Start: 2017-06-04 | End: 2017-06-07 | Stop reason: HOSPADM

## 2017-06-04 RX ORDER — MORPHINE SULFATE 1 MG/ML
INJECTION, SOLUTION EPIDURAL; INTRATHECAL; INTRAVENOUS
Status: DISCONTINUED | OUTPATIENT
Start: 2017-06-04 | End: 2017-06-04

## 2017-06-04 RX ORDER — DIPHENHYDRAMINE HYDROCHLORIDE 50 MG/ML
12.5 INJECTION INTRAMUSCULAR; INTRAVENOUS ONCE
Status: DISCONTINUED | OUTPATIENT
Start: 2017-06-04 | End: 2017-06-07 | Stop reason: HOSPADM

## 2017-06-04 RX ORDER — METOCLOPRAMIDE HYDROCHLORIDE 5 MG/ML
10 INJECTION INTRAMUSCULAR; INTRAVENOUS ONCE
Status: COMPLETED | OUTPATIENT
Start: 2017-06-04 | End: 2017-06-04

## 2017-06-04 RX ORDER — METHYLERGONOVINE MALEATE 0.2 MG/ML
200 INJECTION INTRAVENOUS
Status: DISCONTINUED | OUTPATIENT
Start: 2017-06-04 | End: 2017-06-07 | Stop reason: HOSPADM

## 2017-06-04 RX ORDER — CEFAZOLIN SODIUM 2 G/50ML
2 SOLUTION INTRAVENOUS
Status: DISCONTINUED | OUTPATIENT
Start: 2017-06-04 | End: 2017-06-07 | Stop reason: HOSPADM

## 2017-06-04 RX ORDER — OXYTOCIN 10 [USP'U]/ML
INJECTION, SOLUTION INTRAMUSCULAR; INTRAVENOUS
Status: DISCONTINUED | OUTPATIENT
Start: 2017-06-04 | End: 2017-06-04

## 2017-06-04 RX ORDER — SODIUM CHLORIDE, SODIUM LACTATE, POTASSIUM CHLORIDE, CALCIUM CHLORIDE 600; 310; 30; 20 MG/100ML; MG/100ML; MG/100ML; MG/100ML
INJECTION, SOLUTION INTRAVENOUS CONTINUOUS
Status: ACTIVE | OUTPATIENT
Start: 2017-06-04 | End: 2017-06-05

## 2017-06-04 RX ORDER — ONDANSETRON 2 MG/ML
4 INJECTION INTRAMUSCULAR; INTRAVENOUS EVERY 12 HOURS PRN
Status: DISCONTINUED | OUTPATIENT
Start: 2017-06-04 | End: 2017-06-07 | Stop reason: HOSPADM

## 2017-06-04 RX ORDER — ONDANSETRON 8 MG/1
8 TABLET, ORALLY DISINTEGRATING ORAL EVERY 8 HOURS PRN
Status: DISCONTINUED | OUTPATIENT
Start: 2017-06-04 | End: 2017-06-07 | Stop reason: HOSPADM

## 2017-06-04 RX ORDER — METOCLOPRAMIDE HYDROCHLORIDE 5 MG/ML
INJECTION INTRAMUSCULAR; INTRAVENOUS
Status: COMPLETED
Start: 2017-06-04 | End: 2017-06-04

## 2017-06-04 RX ORDER — FAMOTIDINE 10 MG/ML
INJECTION INTRAVENOUS
Status: COMPLETED
Start: 2017-06-04 | End: 2017-06-04

## 2017-06-04 RX ORDER — HYDROMORPHONE HYDROCHLORIDE 1 MG/ML
0.2 INJECTION, SOLUTION INTRAMUSCULAR; INTRAVENOUS; SUBCUTANEOUS EVERY 10 MIN PRN
Status: DISCONTINUED | OUTPATIENT
Start: 2017-06-04 | End: 2017-06-07 | Stop reason: HOSPADM

## 2017-06-04 RX ORDER — LIDOCAINE HYDROCHLORIDE 20 MG/ML
INJECTION, SOLUTION EPIDURAL; INFILTRATION; INTRACAUDAL; PERINEURAL
Status: DISCONTINUED | OUTPATIENT
Start: 2017-06-04 | End: 2017-06-04

## 2017-06-04 RX ORDER — FAMOTIDINE 10 MG/ML
20 INJECTION INTRAVENOUS ONCE
Status: COMPLETED | OUTPATIENT
Start: 2017-06-04 | End: 2017-06-04

## 2017-06-04 RX ORDER — OXYTOCIN/RINGER'S LACTATE 20/1000 ML
41.65 PLASTIC BAG, INJECTION (ML) INTRAVENOUS CONTINUOUS
Status: DISPENSED | OUTPATIENT
Start: 2017-06-04 | End: 2017-06-04

## 2017-06-04 RX ORDER — SODIUM CHLORIDE, SODIUM LACTATE, POTASSIUM CHLORIDE, CALCIUM CHLORIDE 600; 310; 30; 20 MG/100ML; MG/100ML; MG/100ML; MG/100ML
INJECTION, SOLUTION INTRAVENOUS CONTINUOUS
Status: DISCONTINUED | OUTPATIENT
Start: 2017-06-04 | End: 2017-06-07 | Stop reason: HOSPADM

## 2017-06-04 RX ORDER — NALOXONE HCL 0.4 MG/ML
0.04 VIAL (ML) INJECTION
Status: DISCONTINUED | OUTPATIENT
Start: 2017-06-04 | End: 2017-06-07 | Stop reason: HOSPADM

## 2017-06-04 RX ORDER — OXYCODONE AND ACETAMINOPHEN 5; 325 MG/1; MG/1
1 TABLET ORAL EVERY 4 HOURS PRN
Status: DISCONTINUED | OUTPATIENT
Start: 2017-06-04 | End: 2017-06-07 | Stop reason: HOSPADM

## 2017-06-04 RX ADMIN — SODIUM CHLORIDE, SODIUM LACTATE, POTASSIUM CHLORIDE, AND CALCIUM CHLORIDE 1000 ML: .6; .31; .03; .02 INJECTION, SOLUTION INTRAVENOUS at 08:06

## 2017-06-04 RX ADMIN — EPHEDRINE SULFATE 15 MG: 50 INJECTION, SOLUTION INTRAMUSCULAR; INTRAVENOUS; SUBCUTANEOUS at 11:06

## 2017-06-04 RX ADMIN — EPHEDRINE SULFATE 10 MG: 50 INJECTION, SOLUTION INTRAMUSCULAR; INTRAVENOUS; SUBCUTANEOUS at 11:06

## 2017-06-04 RX ADMIN — METOCLOPRAMIDE HYDROCHLORIDE 10 MG: 5 INJECTION INTRAMUSCULAR; INTRAVENOUS at 09:06

## 2017-06-04 RX ADMIN — OXYTOCIN 10 UNITS: 10 INJECTION, SOLUTION INTRAMUSCULAR; INTRAVENOUS at 11:06

## 2017-06-04 RX ADMIN — MORPHINE SULFATE 3 MG: 1 INJECTION, SOLUTION EPIDURAL; INTRATHECAL; INTRAVENOUS at 10:06

## 2017-06-04 RX ADMIN — SODIUM CHLORIDE, SODIUM LACTATE, POTASSIUM CHLORIDE, AND CALCIUM CHLORIDE 1000 ML: .6; .31; .03; .02 INJECTION, SOLUTION INTRAVENOUS at 11:06

## 2017-06-04 RX ADMIN — SODIUM CITRATE AND CITRIC ACID MONOHYDRATE 30 ML: 500; 334 SOLUTION ORAL at 09:06

## 2017-06-04 RX ADMIN — OXYTOCIN 30 UNITS: 10 INJECTION, SOLUTION INTRAMUSCULAR; INTRAVENOUS at 11:06

## 2017-06-04 RX ADMIN — KETOROLAC TROMETHAMINE 30 MG: 30 INJECTION, SOLUTION INTRAMUSCULAR; INTRAVENOUS at 11:06

## 2017-06-04 RX ADMIN — METOCLOPRAMIDE 10 MG: 5 INJECTION, SOLUTION INTRAMUSCULAR; INTRAVENOUS at 09:06

## 2017-06-04 RX ADMIN — MORPHINE SULFATE 2 MG: 1 INJECTION, SOLUTION EPIDURAL; INTRATHECAL; INTRAVENOUS at 11:06

## 2017-06-04 RX ADMIN — FAMOTIDINE 20 MG: 10 INJECTION, SOLUTION INTRAVENOUS at 09:06

## 2017-06-04 RX ADMIN — LIDOCAINE HYDROCHLORIDE 20 ML: 20 INJECTION, SOLUTION EPIDURAL; INFILTRATION; INTRACAUDAL; PERINEURAL at 10:06

## 2017-06-04 RX ADMIN — SODIUM CHLORIDE, SODIUM LACTATE, POTASSIUM CHLORIDE, AND CALCIUM CHLORIDE: .6; .31; .03; .02 INJECTION, SOLUTION INTRAVENOUS at 09:06

## 2017-06-04 RX ADMIN — SODIUM CITRATE AND CITRIC ACID MONOHYDRATE 30 ML: 334; 500 SOLUTION ORAL at 09:06

## 2017-06-04 RX ADMIN — SODIUM CHLORIDE, SODIUM LACTATE, POTASSIUM CHLORIDE, AND CALCIUM CHLORIDE: .6; .31; .03; .02 INJECTION, SOLUTION INTRAVENOUS at 10:06

## 2017-06-04 RX ADMIN — FAMOTIDINE 20 MG: 10 INJECTION INTRAVENOUS at 09:06

## 2017-06-04 NOTE — BRIEF OP NOTE
Delivery Note    Obstetrician:   Davis Johnson MD    Assistant: N/A    Pre-Delivery Diagnosis: Term pregnancy, Spontaneous labor or Pregnancy complicated by: obesity, CHTN, prev c/s    Post-Delivery Diagnosis: Living  infant(s) or Male    Procedure: Repeat  section    Surgeon: Davis Johnson     Assistants: Rubi Antonio    Anesthesia: Spinal anesthesia    ASA Class: none       Episiotomy or Incision: Pfannensteil    Indications for instrumental delivery: none    Infant Wt: 8lbs,  6oz.          Apgars: 1' 9  /  5' 9     Placenta and Cord:          Mechanism: spontaneous        Description:  complete    Estimated Blood Loss: 800cc           Placenta sent to path:  yes           Complications:  hypertension           Condition: stable      Attending Attestation: I was present and scrubbed for the entire procedure.

## 2017-06-04 NOTE — ANESTHESIA PROCEDURE NOTES
Spinal    Diagnosis: repeat C/S  Patient location during procedure: OR  Start time: 6/4/2017 10:17 AM  Timeout: 6/4/2017 10:17 AM  End time: 6/4/2017 10:35 AM  Staffing  Anesthesiologist: LYRIC STEWART  Performed: anesthesiologist   Preanesthetic Checklist  Completed: patient identified, site marked, surgical consent, pre-op evaluation, timeout performed, IV checked, risks and benefits discussed and monitors and equipment checked  Spinal Block  Patient position: sitting  Prep: ChloraPrep  Patient monitoring: continuous pulse ox  Approach: midline  Location: L3-4  Injection technique: single shot  Additional Notes  Failed spinal times 4 attempts using multiple needles and techniques. Switch to epidural.

## 2017-06-04 NOTE — ANESTHESIA PROCEDURE NOTES
Epidural    Start time: 2017 10:35 AM  Timeout: 2017 10:35 AM  End time: 2017 11:09 AM  Surgery related to:   Staffing  Anesthesiologist: LYRIC STEWART  Performed: anesthesiologist   Preanesthetic Checklist  Completed: patient identified, site marked, surgical consent, pre-op evaluation, timeout performed, IV checked, risks and benefits discussed, monitors and equipment checked, anesthesia consent given, hand hygiene performed and patient being monitored  Preparation  Patient position: sitting  Prep: ChloraPrep  Patient monitoring: Pulse Ox and Blood Pressure  Epidural  Skin Anesthetic: lidocaine 1%  Skin Wheal: 4 mL  Administration type: continuous  Approach: midline  Interspace: L4-5  Injection technique: YVON saline  Needle and Epidural Catheter  Needle type: [facility] in Practice[facility]   Needle gauge: 17  Needle length: 7.0 inches  Needle insertion depth: 7 cm  Catheter type: Jack and Jakeâ€™s  Catheter size: 20 G  Catheter at skin depth: 15 cm  Test dose: 5 mL of lidocaine 1.5% with Epi 1-to-200,000  Additional Documentation: incremental injection, negative aspiration for heme and CSF, no paresthesia on injection, no signs/symptoms of IV or SA injection, no significant pain on injection and no significant complaints from patient  Needle localization: anatomical landmarks  Medications:  Bolus administered: 20 mL of 2.0% ropivacaine and lidocaine  Volume per aspiration: 4 mL  Time between aspirations: 0.5 minutes  Assessment  Upper dermatomal levels - Left: T4  Right: T4  Lower dermatomal level: T6   Dermatomal levels determined by alcohol wipe  Ease of block: difficult  Patient's tolerance of the procedure: comfortable throughout block and no complaints  Post dural Puncture Headache?: No  Additional Notes  Multiple attempts then with successful YVON. Catheter placed no further complications.

## 2017-06-04 NOTE — PLAN OF CARE
0730 Pt arrived from home with complaint of intermittent contractions onset last night. Pt reports no leaking of fluid or vaginal bleeding, but +mucous discharge this morning when wiping. Pt dressed in gown & efm applied with +fht & audible movement. +palpable intermittent contractions. Head to toe assessment completed, wnl. SVE posterior, difficult to reach, dimple and thick. Plan of care reviewed, family at bedside, call bell within reach.    0800 Spoke to Dr Izquierdo,report given as charted above. Will do repeat section at 10am. Patient updated on status. Rubi (1st assist), nursery & anesthesia notified.

## 2017-06-04 NOTE — H&P
History and Physical  Obstetrics      SUBJECTIVE:     Joseph Duenas is a 31 y.o.  female with an Estimated Date of Delivery: 17 admitted for .  Her current obstetrical history is significant for prior , CHTN with worsening BP's.  She reports no bleeding, no leaking and occasional contractions. Fetal Movement: normal.    PTA Medications   Medication Sig    aspirin 81 MG Chew Take 81 mg by mouth once daily.       Review of patient's allergies indicates:  No Known Allergies     No past medical history on file.  Past Surgical History:   Procedure Laterality Date     SECTION       Family History   Problem Relation Age of Onset    Hypertension Father     Arrhythmia Neg Hx     Cardiomyopathy Neg Hx     Early death Neg Hx     Heart attacks under age 50 Neg Hx     Pacemaker/defibrilator Neg Hx     Premature birth Neg Hx      Social History   Substance Use Topics    Smoking status: Never Smoker    Smokeless tobacco: Never Used    Alcohol use Yes      Comment: occas     ROS:  GENERAL: Feeling well overall. Denies fever or chills.   SKIN: Denies rash or lesions.   HEAD: Denies head injury or headache.   NODES: Denies enlarged lymph nodes.   CHEST: Denies chest pain or shortness of breath.   CARDIOVASCULAR: Denies palpitations or left sided chest pain.    ABDOMEN: Denies diarrhea, nausea, vomiting or rectal bleeding.   URINARY: No dysuria, hematuria, or burning on urination.  REPRODUCTIVE: See HPI.   BREASTS: Denies pain, lumps, or nipple discharge.   HEMATOLOGIC: No easy bruisability or excessive bleeding.   MUSCULOSKELETAL: Denies joint pain or swelling.   NEUROLOGIC: Denies syncope or weakness.   PSYCHIATRIC: Denies depression, anxiety or mood swings.         Vital Signs (Most Recent)  Temp: 98 °F (36.7 °C) (17)  Pulse: 98 (17)  Resp: 18 (17)  BP: 129/62 (1730)  SpO2: 99 % (17)    Physical Exam:  General:  alert and normal  appearing gravid female   Skin:  Skin color, texture, turgor normal. No rashes or lesions   HEENT:  conjunctivae/corneas clear. PERRL.   Lungs:  clear to auscultation bilaterally   Heart:  regular rate and rhythm       Abdomen:  soft, non-tender; bowel sounds normal   Uterine Size:  size equals dates   Presentations:  cephalic   FHT:  140's BPM   Pelvis: Exam deferred.   Cervix:     Dilation: Closed    Effacement: 75%    Station:  -2    Consistency: medium    Position: middle     Laboratory:  No results for input(s): ABORH, HEPBSAG, RUBELLAIGGSC, GBS, AFP, EADBNIW2FL, CBC in the last 168 hours.   ASSESSMENT/PLAN:     38w6d gestation.  Early latent labor.     Conditions: Hypertension: CHTN with Superimposed Preeclampsia and Prior     Patient Active Problem List   Diagnosis    HTN complicating peripregnancy, antepartum    BMI 40.0-44.9, adult    Current maternal condition affecting pregnancy    36 weeks gestation of pregnancy        Risks, benefits, alternatives and possible complications have been discussed in detail with the patient.  Pre-admission, admission, and post admission procedures and expectations were discussed in detail.  All questions answered, all appropriate consents will be signed at the Hospital. Admission is planned for today.   spinal

## 2017-06-04 NOTE — L&D DELIVERY NOTE
Delivery Information for  Madan Duenas    Birth information:  YOB: 2016   Time of birth: 11:31 AM   Sex: male   Head Delivery Date/Time:     Delivery type:    Gestational Age: 38w6d              [place] Assessment    No data filed                          Interventions/Resuscitation         Cord    No data filed              Labor Events:       labor:       Labor Onset Date/Time:         Dilation Complete Date/Time:         Start Pushing Date/Time:       Rupture Date/Time:              Rupture type:           Fluid Amount:        Fluid Color:        Fluid Odor:        Membrane Status (PeriCalm):        Rupture Date/Time (PeriCalm):        Fluid Amount (PeriCalm):        Fluid Color (PeriCalm):         steroids:       Antibiotics given for GBS:       Induction:       Indications for induction:        Augmentation:       Indications for augmentation:       Labor complications:       Additional complications:          Cervical ripening:                     Delivery:      Episiotomy:       Indication for Episiotomy:       Perineal Lacerations:   Repaired:      Periurethral Laceration:   Repaired:     Labial Laceration:   Repaired:     Sulcus Laceration:   Repaired:     Vaginal Laceration:   Repaired:     Cervical Laceration:   Repaired:     Repair suture:       Repair # of packets:       Vaginal delivery QBL (mL):        QBL (mL): 0     Combined Blood Loss (mL): 0     Vaginal Sweep Performed:       Surgicount Correct:         Other providers:            Details (if applicable):  Trial of Labor      Categorization:      Priority:     Indications for :     Incision Type:       Additional  information:  Forceps:    Vacuum:    Breech:    Observed anomalies    Other (Comments):

## 2017-06-05 LAB
BASOPHILS # BLD AUTO: 0.01 K/UL
BASOPHILS NFR BLD: 0.1 %
DIFFERENTIAL METHOD: ABNORMAL
EOSINOPHIL # BLD AUTO: 0.1 K/UL
EOSINOPHIL NFR BLD: 0.8 %
ERYTHROCYTE [DISTWIDTH] IN BLOOD BY AUTOMATED COUNT: 15.1 %
HCT VFR BLD AUTO: 29.3 %
HGB BLD-MCNC: 9.4 G/DL
LYMPHOCYTES # BLD AUTO: 1 K/UL
LYMPHOCYTES NFR BLD: 9.5 %
MCH RBC QN AUTO: 26.2 PG
MCHC RBC AUTO-ENTMCNC: 32.1 %
MCV RBC AUTO: 82 FL
MONOCYTES # BLD AUTO: 0.8 K/UL
MONOCYTES NFR BLD: 7.5 %
NEUTROPHILS # BLD AUTO: 8.6 K/UL
NEUTROPHILS NFR BLD: 82.1 %
PLATELET # BLD AUTO: 173 K/UL
PLATELET BLD QL SMEAR: ABNORMAL
PMV BLD AUTO: 12.4 FL
RBC # BLD AUTO: 3.59 M/UL
RPR SER QL: NORMAL
WBC # BLD AUTO: 10.47 K/UL

## 2017-06-05 PROCEDURE — 11000001 HC ACUTE MED/SURG PRIVATE ROOM

## 2017-06-05 PROCEDURE — 25000003 PHARM REV CODE 250: Performed by: OBSTETRICS & GYNECOLOGY

## 2017-06-05 PROCEDURE — 85025 COMPLETE CBC W/AUTO DIFF WBC: CPT

## 2017-06-05 PROCEDURE — 36415 COLL VENOUS BLD VENIPUNCTURE: CPT

## 2017-06-05 RX ORDER — LABETALOL 200 MG/1
200 TABLET, FILM COATED ORAL EVERY 12 HOURS
Status: DISCONTINUED | OUTPATIENT
Start: 2017-06-05 | End: 2017-06-07 | Stop reason: HOSPADM

## 2017-06-05 RX ORDER — LABETALOL 200 MG/1
200 TABLET, FILM COATED ORAL EVERY 12 HOURS
Status: DISCONTINUED | OUTPATIENT
Start: 2017-06-05 | End: 2017-06-05

## 2017-06-05 RX ADMIN — DOCUSATE SODIUM 200 MG: 100 CAPSULE, LIQUID FILLED ORAL at 10:06

## 2017-06-05 RX ADMIN — LABETALOL HYDROCHLORIDE 200 MG: 200 TABLET, FILM COATED ORAL at 06:06

## 2017-06-05 RX ADMIN — OXYCODONE HYDROCHLORIDE AND ACETAMINOPHEN 1 TABLET: 10; 325 TABLET ORAL at 11:06

## 2017-06-05 RX ADMIN — OXYCODONE HYDROCHLORIDE AND ACETAMINOPHEN 1 TABLET: 10; 325 TABLET ORAL at 03:06

## 2017-06-05 RX ADMIN — OXYCODONE HYDROCHLORIDE AND ACETAMINOPHEN 1 TABLET: 10; 325 TABLET ORAL at 10:06

## 2017-06-05 RX ADMIN — DOCUSATE SODIUM 200 MG: 100 CAPSULE, LIQUID FILLED ORAL at 07:06

## 2017-06-05 RX ADMIN — OXYCODONE HYDROCHLORIDE AND ACETAMINOPHEN 1 TABLET: 10; 325 TABLET ORAL at 07:06

## 2017-06-05 RX ADMIN — OXYCODONE AND ACETAMINOPHEN 1 TABLET: 5; 325 TABLET ORAL at 03:06

## 2017-06-05 NOTE — PROGRESS NOTES
Patient with chronic HTN.  Continues to have BPs in the mild range - approaching severe range. Will start labetalol 200mg PO BID.    mai heredia MD

## 2017-06-05 NOTE — PROGRESS NOTES
POD #2    S: patient doing well. Not eating yet (no appetite). Not passing flatus. Pain controlled. Voiding.    O  Temp:  [97.4 °F (36.3 °C)-97.8 °F (36.6 °C)] 97.4 °F (36.3 °C)  Pulse:  [85-97] 94  Resp:  [18] 18  SpO2:  [99 %] 99 %  BP: (119-151)/(70-99) 151/95   Gen: A&ox3, NAD  Abd: soft, fundus firm and nontender, incision with staples c/d/i  Ext: no edema    Lab Results   Component Value Date    WBC 10.47 06/05/2017    HGB 9.4 (L) 06/05/2017    HCT 29.3 (L) 06/05/2017    MCV 82 06/05/2017     06/05/2017     AP: s/p rLTCS, doing well  -continue routine pp care  -postop H/H stable  -cHTN: will monitor today - may need to start PO tory Oropeza MD

## 2017-06-05 NOTE — ANESTHESIA POSTPROCEDURE EVALUATION
Anesthesia Post Evaluation    Patient: Joseph Duenas    Procedure(s) Performed: Procedure(s) (LRB):  DELIVERY-CEASAREAN SECTION (N/A)    Final Anesthesia Type: epidural  Patient location during evaluation: labor & delivery  Patient participation: Yes- Able to Participate  Level of consciousness: awake and alert and oriented  Post-procedure vital signs: reviewed and stable  Pain management: adequate  Airway patency: patent  PONV status at discharge: No PONV  Anesthetic complications: no      Cardiovascular status: blood pressure returned to baseline and hemodynamically stable  Respiratory status: unassisted, spontaneous ventilation and room air  Hydration status: euvolemic  Follow-up not needed.      Neuro Exam    Epidural catheter still in place. To be removed this evening by Dr. Saul Powers.   No headache/neckache/backache  Full return of neurological function  Able to urinate  Advised patient to report any new problems of back pain, especially with fever or decreasing bladder function occurring during coming days to weeks        Visit Vitals  BP (!) 143/94 (BP Location: Right arm, Patient Position: Lying, BP Method: Automatic)   Pulse 92   Temp 36.3 °C (97.4 °F) (Oral)   Resp 18   Wt 132.5 kg (292 lb)   LMP 09/05/2016 (Approximate)   SpO2 99%   Breastfeeding? Unknown   BMI 45.72 kg/m²       Pain/[place] Score: Pain Rating Prior to Med Admin: 7 (6/5/2017 10:10 AM)  Pain Rating Post Med Admin: 0 (6/5/2017  4:19 AM)

## 2017-06-05 NOTE — PROGRESS NOTES
Pt to bathroom with minimal assistance and voided 300ml urine with rubra.  Then patient to shower  0830 Abdominal dressing removed and incision intact with staples.  Pt tolerated well  1245  Dr Pan on unit and report given of B/P increasing but patient has also been experiencing pain today and medicated.  Will continue to monitor

## 2017-06-05 NOTE — PLAN OF CARE
0600 Botello cath removed, tip intact.  Xenia care done, pt tolerated well.  Pt instructed to call for assistance prior to ambulating.  Pt verbalized understanding.

## 2017-06-06 PROCEDURE — 11000001 HC ACUTE MED/SURG PRIVATE ROOM

## 2017-06-06 PROCEDURE — 25000003 PHARM REV CODE 250: Performed by: OBSTETRICS & GYNECOLOGY

## 2017-06-06 RX ADMIN — OXYCODONE HYDROCHLORIDE AND ACETAMINOPHEN 1 TABLET: 10; 325 TABLET ORAL at 08:06

## 2017-06-06 RX ADMIN — OXYCODONE HYDROCHLORIDE AND ACETAMINOPHEN 1 TABLET: 10; 325 TABLET ORAL at 04:06

## 2017-06-06 RX ADMIN — OXYCODONE HYDROCHLORIDE AND ACETAMINOPHEN 1 TABLET: 10; 325 TABLET ORAL at 03:06

## 2017-06-06 RX ADMIN — SIMETHICONE CHEW TAB 80 MG 80 MG: 80 TABLET ORAL at 03:06

## 2017-06-06 RX ADMIN — LABETALOL HYDROCHLORIDE 200 MG: 200 TABLET, FILM COATED ORAL at 08:06

## 2017-06-06 RX ADMIN — DOCUSATE SODIUM 200 MG: 100 CAPSULE, LIQUID FILLED ORAL at 08:06

## 2017-06-06 NOTE — DISCHARGE SUMMARY
Delivery Discharge Summary  Obstetrics      Primary OB Clinician: Davis Johnson MD    Admission date: 2017  Discharge date: 2017    Admit Dx:   Patient Active Problem List   Diagnosis    HTN complicating peripregnancy, antepartum    BMI 40.0-44.9, adult    Current maternal condition affecting pregnancy    36 weeks gestation of pregnancy     Discharge Dx:   Patient Active Problem List   Diagnosis    HTN complicating peripregnancy, antepartum    BMI 40.0-44.9, adult    Current maternal condition affecting pregnancy    36 weeks gestation of pregnancy       Procedure: , due to prior c/s    Hospital Course:  Pt is a 31 y.o. now  by , due to prior c/s and labor, POD # 3 was admitted on 2017. On initial assessment, vital signs were stable and physical exam was Normal.  Patient was subsequently admitted to labor and delivery unit with signed consents.  Patient subsequently delivered a viable infant, please see delivery information below or full delivery note for further details. Pt was in stable condition post delivery and was transferred to the Mother-Baby Unit in a stable condition. Her postpartum course was uncomplicated. On discharge day, patient's pain is well  controlled with oral pain medications. Pt is tolerating ambulation without SOB or CP, and PO diet without N/V. Reports lochia is minimal in degree. Denies any HA, vision changes, F/C, LE swelling. Denies any breast pain/soreness.  Pt in stable condition and ready for discharge, instructed to continue PNV, Iron supplement, and to follow up in the OB clinic in 1 and 6 weeks with Dr. Johnson.      Delivery:    Episiotomy:     Lacerations:     Repair suture:     Repair # of packets:     Blood loss (ml):       Birth information:  YOB: 2016   Time of birth: 11:31 AM   Sex: male   Delivery type: , Low Transverse   Gestational Age: 38w6d    Delivery Clinician:      Other providers:        Additional  information:  Forceps:    Vacuum:    Breech:    Observed anomalies      Living?:           APGARS  One minute Five minutes Ten minutes   Skin color:         Heart rate:         Grimace:         Muscle tone:         Breathing:         Totals: 9  9        Placenta: Delivered:       appearance      Patient Instructions:   Current Discharge Medication List      CONTINUE these medications which have NOT CHANGED    Details   aspirin 81 MG Chew Take 81 mg by mouth once daily.             D/C pt. To home in stable condition  Reg diet  Ad giovanni activity with pelvic rest x 6 wks  Call for fever >101, heavy vag bleeding, pain uncontrolled w/ pain meds  F/u in office in 1 week for staple removal and  6-8wks for final check-up  Motrin 800mg, Percocet 5/325mg  Davis Izquierdo MD

## 2017-06-06 NOTE — PLAN OF CARE
Problem: Patient Care Overview  Goal: Individualization & Mutuality  Outcome: Ongoing (interventions implemented as appropriate)  Vss. Pt tolerating reg diet, ambulating and voiding without difficulty. Mother and infant bonding noted. Pain well controlled with current po pain meds per md order. Family support at bedside.       Dc order noted.  Discussed with dr matias the pt having a discharge order on 2 nd day post op. He was thinking it was 3rd day he stated if she wasn't ready for discharge she could stay tonight and cancel discharge order.   Spoke with pt about option to be discharged today after circ and infant voiding post circ or staying one more night. Pt stated she would feel more comfortable staying tonight.

## 2017-06-06 NOTE — DISCHARGE INSTRUCTIONS
"Patient Discharge Instructions for Postpartum Women    Resume Regular Diet  Increase activity gradually, no heavy lifting:nothing over 10 lbsShower  No tampons, douching or sexual intercourse.6 weeks  Discuss birth control options with your physician.  Wear a support bra  Return to work/school when you've been cleared by a physician    Call your physician if     *Fever of 100.4 or higher  *Persistent nausea/ vomiting  *Incisional drainage  *Heavy vaginal bleeding or large clots (Heavy bleeding is soaking 1 pad in an hour)  *Swelling and pain in arms or legs  *Severe headaches, blurred vision or fainting  *Shortness of breath  *Frequency and burning with urination  *Signs of postpartum depression, discuss these signs with your physician    Call lactation services for questions regarding feeding, nipple and breast care, and general questions about lactation.  They can be reached at [phone]         Understanding Postpartum Depression    You've just had a baby.  You know you should be excited and happy.  But instead you find yourself crying for no reason.  You may have trouble coping with your daily tasks.  You feel sad, tired, and hopeless most of the time.  You may even feel ashamed or guilty.  But what you're going through is not your fault and you can feel better.  Talk to your doctor.  He or she can help.    Depression After Childbirth    You may be weepy and tired right after giving birth.  These feelings are normal.  They're sometimes called the "baby blues."  These blues go away 2-3 weeks.  However, postpartum (meaning "after birth") depression lasts much longer and is more sever than the "baby blues."  It can make you feel sad and hopeless.  You may also fear that your baby will be harmed and worry about being a bad mother.      What is Depression?    Depression is a mood disorder that affects the way you think and feel.  The most common symptom is a feeling of deep sadness.  You may also feel as if you " just can't cope with life.    Other symptoms include:      * Gaining or loosing weight  * Sleeping too much or too little  * Feeling tired all the time  * Feeling restless  * Fears of harming your baby   * Lack of interest in your baby  * Feeling worthless or guilty  * No longer finding pleasure in things you used to  * Having trouble thinking clearly or making decisions  * Thoughts of hurting yourself or your baby    What Causes Postpartum Depression    The exact causes of postpartum depression isn't known.  It may be due to changes in your hormones during and after childbirth.  You may also be tired from caring for your baby and adjusting to being a mother.  All these factors may make you feel depressed.  In some cases, your genes may also play a role.    Depression Can Be Treated    The good news is that there are many ways to treat postpartum depression.  Talking to your doctor is the first step toward feeling better.    Resources:    * National [place] of Mental Health  -- 838.789.1047    www.nimh.nih.gov    * National [place] on Mental Illness --265.790.2073    Www.marcial.org    * Mental Health [place] -- 543.943.2068     Www.[facility].org    * National Suicide Hotline --986.392.9468 (800-SUICIDE)    6105-3415 [employer]  All rights reserved.  This information is not intended as a substitute for professional medical care.  Always follow up with your healthcare professional's instructions.

## 2017-06-07 VITALS
SYSTOLIC BLOOD PRESSURE: 157 MMHG | DIASTOLIC BLOOD PRESSURE: 107 MMHG | RESPIRATION RATE: 18 BRPM | BODY MASS INDEX: 45.72 KG/M2 | TEMPERATURE: 98 F | HEART RATE: 92 BPM | WEIGHT: 292 LBS | OXYGEN SATURATION: 98 %

## 2017-06-07 PROCEDURE — 25000003 PHARM REV CODE 250: Performed by: OBSTETRICS & GYNECOLOGY

## 2017-06-07 RX ORDER — FUROSEMIDE 40 MG/1
40 TABLET ORAL ONCE
Status: COMPLETED | OUTPATIENT
Start: 2017-06-07 | End: 2017-06-07

## 2017-06-07 RX ORDER — NIFEDIPINE 30 MG/1
30 TABLET, EXTENDED RELEASE ORAL DAILY
Status: DISCONTINUED | OUTPATIENT
Start: 2017-06-07 | End: 2017-06-07 | Stop reason: HOSPADM

## 2017-06-07 RX ORDER — NIFEDIPINE 30 MG/1
30 TABLET, EXTENDED RELEASE ORAL DAILY
Start: 2017-06-07 | End: 2017-06-09

## 2017-06-07 RX ORDER — OXYCODONE AND ACETAMINOPHEN 5; 325 MG/1; MG/1
1 TABLET ORAL EVERY 4 HOURS PRN
Refills: 0
Start: 2017-06-07 | End: 2017-07-14

## 2017-06-07 RX ADMIN — SIMETHICONE CHEW TAB 80 MG 80 MG: 80 TABLET ORAL at 02:06

## 2017-06-07 RX ADMIN — NIFEDIPINE 30 MG: 30 TABLET, FILM COATED, EXTENDED RELEASE ORAL at 10:06

## 2017-06-07 RX ADMIN — DOCUSATE SODIUM 200 MG: 100 CAPSULE, LIQUID FILLED ORAL at 08:06

## 2017-06-07 RX ADMIN — LABETALOL HYDROCHLORIDE 200 MG: 200 TABLET, FILM COATED ORAL at 08:06

## 2017-06-07 RX ADMIN — OXYCODONE HYDROCHLORIDE AND ACETAMINOPHEN 1 TABLET: 10; 325 TABLET ORAL at 08:06

## 2017-06-07 RX ADMIN — FUROSEMIDE 40 MG: 40 TABLET ORAL at 09:06

## 2017-06-07 RX ADMIN — SIMETHICONE CHEW TAB 80 MG 80 MG: 80 TABLET ORAL at 08:06

## 2017-06-07 RX ADMIN — OXYCODONE HYDROCHLORIDE AND ACETAMINOPHEN 1 TABLET: 10; 325 TABLET ORAL at 02:06

## 2017-06-07 NOTE — PLAN OF CARE
Problem: Patient Care Overview  Goal: Individualization & Mutuality  Outcome: Outcome(s) achieved Date Met: 06/07/17  CALL PLACED to Dr Recio regarding pts elevated BP despite being on labetelol. New order noted. Dr Recio stated to discharge pt and give rx for procardia 30  xl  Once a day po, follow up Friday for bp check in [place] office.   Dr Whiting on unit 0830 to assess pt.   Dr Whiting left rx for discharge procardia xl   Pt bp eleavted, meds given per md order. Will cont to [id number]  Discharge orders noted  1245 Pt given all discharge instructions including prescriptions from MD.Discussed importance of taking bp meds and signs/symptoms of eclampsia. Instructed to follow up with MD Friday (2 days) for bp check and staple removal. Betasept given with instructions on use for incision care. Incision care explained to pt. Pt verbalized understanding   Questions regarding self care answered. Pt also received mother/baby care guide booklet during hospital stay. Reviewed at discharge. States she feels comfortable and ready for discharge to home. Accompanied by family, with baby in arms via wheelchair.

## 2017-06-07 NOTE — PROGRESS NOTES
POD #3 s/p /pre-eclampsia    Subjective: No complaints. Positive flatus. Denies any symptoms of pre-eclampsia    Objective: BP (!) 152/100 (BP Location: Left arm, Patient Position: Lying, BP Method: Automatic)   Pulse 96   Temp 97.9 °F (36.6 °C) (Oral)   Resp 18   Wt 132.5 kg (292 lb)   LMP 2016 (Approximate)   SpO2 98%   Breastfeeding? Unknown   BMI 45.72 kg/m²     H/H:   Lab Results   Component Value Date    WBC 10.47 2017    HGB 9.4 (L) 2017    HCT 29.3 (L) 2017    MCV 82 2017     2017         Chest: Clear to auscultation  CV: Regular rate and rhythm  Abdomen: Non-tender, non-distended, soft, positive bowel sounds  Incision: Clean, dry, intact  Extremities: Non-tender, 1+ edema    Assessment: S/P /Pre-eclampsia    Plan: Discharge home today after Lasix and will switch to Procardia per Dr. Izquierdo's message relayed to nurse. Patient to see Dr. Izquierdo next week on Friday for staple removal and check BP. Pre-eclampsia precautions given.

## 2017-06-09 ENCOUNTER — OFFICE VISIT (OUTPATIENT)
Dept: OBSTETRICS AND GYNECOLOGY | Facility: CLINIC | Age: 32
End: 2017-06-09
Payer: MEDICAID

## 2017-06-09 VITALS
WEIGHT: 273.38 LBS | DIASTOLIC BLOOD PRESSURE: 78 MMHG | HEIGHT: 67 IN | SYSTOLIC BLOOD PRESSURE: 138 MMHG | BODY MASS INDEX: 42.91 KG/M2

## 2017-06-09 DIAGNOSIS — Z48.02: ICD-10-CM

## 2017-06-09 PROCEDURE — 99212 OFFICE O/P EST SF 10 MIN: CPT | Mod: PBBFAC,PO | Performed by: OBSTETRICS & GYNECOLOGY

## 2017-06-09 PROCEDURE — 99212 OFFICE O/P EST SF 10 MIN: CPT | Mod: S$PBB,24,, | Performed by: OBSTETRICS & GYNECOLOGY

## 2017-06-09 PROCEDURE — 99999 PR PBB SHADOW E&M-EST. PATIENT-LVL II: CPT | Mod: PBBFAC,,, | Performed by: OBSTETRICS & GYNECOLOGY

## 2017-06-09 RX ORDER — HYDROCODONE BITARTRATE AND ACETAMINOPHEN 5; 325 MG/1; MG/1
TABLET ORAL
COMMUNITY
Start: 2017-06-07 | End: 2017-06-09

## 2017-06-09 RX ORDER — NIFEDIPINE 30 MG/1
30 TABLET, EXTENDED RELEASE ORAL DAILY
Status: ON HOLD | COMMUNITY
End: 2024-01-25 | Stop reason: HOSPADM

## 2017-06-09 RX ORDER — IBUPROFEN 800 MG/1
TABLET ORAL
COMMUNITY
Start: 2017-06-07 | End: 2017-07-14

## 2017-06-09 NOTE — PROGRESS NOTES
H/o CHTN, s/p rpt c/s  Here for BP check and staple removal  Inc looks good  Staples removed  BP's good  Cont procardia 30xl  Recheck in 6 wks  PIH precautions given

## 2017-07-14 ENCOUNTER — OFFICE VISIT (OUTPATIENT)
Dept: OBSTETRICS AND GYNECOLOGY | Facility: CLINIC | Age: 32
End: 2017-07-14
Payer: MEDICAID

## 2017-07-14 VITALS
HEIGHT: 66 IN | BODY MASS INDEX: 42.27 KG/M2 | DIASTOLIC BLOOD PRESSURE: 86 MMHG | SYSTOLIC BLOOD PRESSURE: 135 MMHG | WEIGHT: 263 LBS

## 2017-07-14 DIAGNOSIS — Z30.9 ENCOUNTER FOR CONTRACEPTIVE MANAGEMENT, UNSPECIFIED TYPE: Primary | ICD-10-CM

## 2017-07-14 PROBLEM — O99.891 CURRENT MATERNAL CONDITION AFFECTING PREGNANCY: Status: RESOLVED | Noted: 2017-02-01 | Resolved: 2017-07-14

## 2017-07-14 PROBLEM — Z3A.36 36 WEEKS GESTATION OF PREGNANCY: Status: RESOLVED | Noted: 2017-06-04 | Resolved: 2017-07-14

## 2017-07-14 PROBLEM — O16.9 HTN COMPLICATING PERIPREGNANCY, ANTEPARTUM: Status: RESOLVED | Noted: 2017-01-11 | Resolved: 2017-07-14

## 2017-07-14 PROCEDURE — 99499 UNLISTED E&M SERVICE: CPT | Mod: S$PBB,,, | Performed by: OBSTETRICS & GYNECOLOGY

## 2017-07-14 PROCEDURE — 99212 OFFICE O/P EST SF 10 MIN: CPT | Mod: PBBFAC,PN | Performed by: OBSTETRICS & GYNECOLOGY

## 2017-07-14 PROCEDURE — 87591 N.GONORRHOEAE DNA AMP PROB: CPT

## 2017-07-14 PROCEDURE — 99999 PR PBB SHADOW E&M-EST. PATIENT-LVL II: CPT | Mod: PBBFAC,,, | Performed by: OBSTETRICS & GYNECOLOGY

## 2017-07-14 RX ORDER — NORGESTIMATE AND ETHINYL ESTRADIOL 7DAYSX3 LO
1 KIT ORAL DAILY
Qty: 30 TABLET | Refills: 11 | Status: SHIPPED | OUTPATIENT
Start: 2017-07-14 | End: 2024-01-19

## 2017-07-14 NOTE — PROGRESS NOTES
"H/O CHTN, s/p rpt C/S 17  BP today 135/86.  Currently menstruating  Would like IUD. Mirena forms signed. GC/CT ordered.   Ortho Tricyclen prescribed  RTC for IUD insertion    CC: Post-partum follow-up    Joseph Duenas is a 32 y.o. female  presents for post-partum visit s/p a , due to prior c/s.    Delivery Date: 2016  Delivery MD: Timbo  Gender: male   Name:   Birth Weight: 8 pounds 6 ounces  Breast Feeding: NO  Depression: NO  Contraception: IUD    Pregnancy was complicated by:  None    History reviewed. No pertinent past medical history.  Past Surgical History:   Procedure Laterality Date     SECTION       Social History   Substance Use Topics    Smoking status: Never Smoker    Smokeless tobacco: Never Used    Alcohol use Yes      Comment: occas     Family History   Problem Relation Age of Onset    Hypertension Father     Arrhythmia Neg Hx     Cardiomyopathy Neg Hx     Early death Neg Hx     Heart attacks under age 50 Neg Hx     Pacemaker/defibrilator Neg Hx     Premature birth Neg Hx      OB History    Para Term  AB Living   4 2 2   2 1   SAB TAB Ectopic Multiple Live Births   1     0 1      # Outcome Date GA Lbr Paul/2nd Weight Sex Delivery Anes PTL Lv   4 Term 17 38w6d  3.816 kg (8 lb 6.6 oz) M CS-LTranv EPI N    3 AB 2015           2 SAB            1 Term 07 39w0d  3.884 kg (8 lb 9 oz) M CS-LTranv  N RODOLFO      Birth Comments: per pt "toxemia at end of pregnancy"          Current Outpatient Prescriptions:     nifedipine (PROCARDIA XL) 30 MG (OSM) 24 hr tablet, Take 30 mg by mouth once daily., Disp: , Rfl:     norgestimate-ethinyl estradiol (ORTHO TRI-CYCLEN LO) 0.18/0.215/0.25 mg-25 mcg tablet, Take 1 tablet by mouth once daily., Disp: 30 tablet, Rfl: 11     /86   Ht 5' 6" (1.676 m)   Wt 119.3 kg (263 lb)   LMP 2016 (Approximate)   BMI 42.45 kg/m²     ROS:  GENERAL: No fever, chills, fatigability or weight " loss.  VULVAR: No pain, no lesions and no itching.  VAGINAL: No relaxation, no itching, no discharge, no abnormal bleeding and no lesions.  ABDOMEN: No abdominal pain. Denies nausea. Denies vomiting. No diarrhea. No constipation  BREAST: Denies pain. No lumps. No discharge.  URINARY: No incontinence, no nocturia, no frequency and no dysuria.  CARDIOVASCULAR: No chest pain. No shortness of breath. No leg cramps.  NEUROLOGICAL: No headaches. No vision changes.    PE:   APPEARANCE: Well nourished, well developed, in no acute distress.  NECK: Neck symmetric without  thyromegaly.  CHEST: Lungs clear to auscultation.  HEART: Regular rate and rhythm, no murmurs, rubs or gallops.  ABDOMEN: Soft. No tenderness or masses. No hernias.  BREASTS: Symmetrical, no skin changes or visible lesions. No palpable masses, nipple discharge or adenopathy bilaterally.  PELVIC: External female genitalia without lesions. Normal hair distribution. Adequate perineal body, normal urethral meatus. Vagina moist and well rugated without lesions or discharge. Cervix pink and without lesions. No significant cystocele or rectocele. Bimanual exam showed uterus normal size, shape, position, mobile and nontender. Adnexa without masses or tenderness. Urethra and bladder normal.  EXTREMITIES: No edema.      ASSESSMENT:  1. Postpartum Exam    PLAN:  RTO for iud  rx for ocp's sent in for 1 month  Urine for GC/CT           Patient was counseled today on A.C.S. Pap guidelines and recommendations for yearly pelvic exams and monthly self breast exams; to see her PCP for other health maintenance and contraception options.

## 2017-07-15 LAB
C TRACH DNA SPEC QL NAA+PROBE: NOT DETECTED
N GONORRHOEA DNA SPEC QL NAA+PROBE: NOT DETECTED

## 2018-01-06 ENCOUNTER — HOSPITAL ENCOUNTER (EMERGENCY)
Facility: HOSPITAL | Age: 33
Discharge: HOME | End: 2018-01-06
Attending: EMERGENCY MEDICINE
Payer: MEDICAID

## 2018-01-06 VITALS
TEMPERATURE: 98 F | HEART RATE: 79 BPM | SYSTOLIC BLOOD PRESSURE: 157 MMHG | OXYGEN SATURATION: 100 % | HEIGHT: 67 IN | DIASTOLIC BLOOD PRESSURE: 95 MMHG | RESPIRATION RATE: 19 BRPM | WEIGHT: 240 LBS | BODY MASS INDEX: 37.67 KG/M2

## 2018-01-06 DIAGNOSIS — B34.9 ACUTE VIRAL SYNDROME: Primary | ICD-10-CM

## 2018-01-06 DIAGNOSIS — I16.0 HYPERTENSIVE URGENCY: ICD-10-CM

## 2018-01-06 LAB
ALBUMIN SERPL BCP-MCNC: 4.5 G/DL
ALP SERPL-CCNC: 102 U/L
ALT SERPL W/O P-5'-P-CCNC: 84 U/L
ANION GAP SERPL CALC-SCNC: 13 MMOL/L
AST SERPL-CCNC: 77 U/L
B-HCG UR QL: NEGATIVE
BASOPHILS # BLD AUTO: 0.12 K/UL
BASOPHILS NFR BLD: 1.8 %
BILIRUB SERPL-MCNC: 0.8 MG/DL
BUN SERPL-MCNC: 10 MG/DL
CALCIUM SERPL-MCNC: 9 MG/DL
CHLORIDE SERPL-SCNC: 106 MMOL/L
CO2 SERPL-SCNC: 25 MMOL/L
CREAT SERPL-MCNC: 0.65 MG/DL
DIFFERENTIAL METHOD: NORMAL
EOSINOPHIL # BLD AUTO: 0.5 K/UL
EOSINOPHIL NFR BLD: 7.4 %
ERYTHROCYTE [DISTWIDTH] IN BLOOD BY AUTOMATED COUNT: 14.5 %
EST. GFR  (AFRICAN AMERICAN): >60 ML/MIN/1.73 M^2
EST. GFR  (NON AFRICAN AMERICAN): >60 ML/MIN/1.73 M^2
FLUAV AG SPEC QL IA: NEGATIVE
FLUBV AG SPEC QL IA: NEGATIVE
GLUCOSE SERPL-MCNC: 94 MG/DL
HCT VFR BLD AUTO: 41.9 %
HGB BLD-MCNC: 13.6 G/DL
LYMPHOCYTES # BLD AUTO: 2.8 K/UL
LYMPHOCYTES NFR BLD: 40.9 %
MCH RBC QN AUTO: 28.2 PG
MCHC RBC AUTO-ENTMCNC: 32.5 G/DL
MCV RBC AUTO: 87 FL
MONOCYTES # BLD AUTO: 0.5 K/UL
MONOCYTES NFR BLD: 7.5 %
NEUTROPHILS # BLD AUTO: 2.9 K/UL
NEUTROPHILS NFR BLD: 42.3 %
NT-PROBNP: 42 PG/ML
PLATELET # BLD AUTO: 248 K/UL
PMV BLD AUTO: 11.3 FL
POTASSIUM SERPL-SCNC: 4.2 MMOL/L
PROT SERPL-MCNC: 8.8 G/DL
RBC # BLD AUTO: 4.83 M/UL
SODIUM SERPL-SCNC: 144 MMOL/L
SPECIMEN SOURCE: NORMAL
TROPONIN I SERPL DL<=0.01 NG/ML-MCNC: 0.02 NG/ML
WBC # BLD AUTO: 6.78 K/UL

## 2018-01-06 PROCEDURE — 83880 ASSAY OF NATRIURETIC PEPTIDE: CPT

## 2018-01-06 PROCEDURE — 25000003 PHARM REV CODE 250: Performed by: EMERGENCY MEDICINE

## 2018-01-06 PROCEDURE — 99284 EMERGENCY DEPT VISIT MOD MDM: CPT | Mod: 25

## 2018-01-06 PROCEDURE — 85025 COMPLETE CBC W/AUTO DIFF WBC: CPT

## 2018-01-06 PROCEDURE — 94760 N-INVAS EAR/PLS OXIMETRY 1: CPT

## 2018-01-06 PROCEDURE — 93010 ELECTROCARDIOGRAM REPORT: CPT | Mod: ,,, | Performed by: INTERNAL MEDICINE

## 2018-01-06 PROCEDURE — 93005 ELECTROCARDIOGRAM TRACING: CPT

## 2018-01-06 PROCEDURE — 84484 ASSAY OF TROPONIN QUANT: CPT

## 2018-01-06 PROCEDURE — 80053 COMPREHEN METABOLIC PANEL: CPT

## 2018-01-06 PROCEDURE — 81025 URINE PREGNANCY TEST: CPT

## 2018-01-06 PROCEDURE — 87400 INFLUENZA A/B EACH AG IA: CPT

## 2018-01-06 RX ORDER — NAPROXEN 500 MG/1
500 TABLET ORAL 2 TIMES DAILY WITH MEALS
Qty: 60 TABLET | Refills: 0 | Status: SHIPPED | OUTPATIENT
Start: 2018-01-06 | End: 2023-09-06

## 2018-01-06 RX ORDER — BENZONATATE 100 MG/1
100 CAPSULE ORAL 3 TIMES DAILY PRN
Qty: 20 CAPSULE | Refills: 0 | Status: SHIPPED | OUTPATIENT
Start: 2018-01-06 | End: 2018-01-16

## 2018-01-06 RX ORDER — CLONIDINE HYDROCHLORIDE 0.1 MG/1
0.1 TABLET ORAL
Status: COMPLETED | OUTPATIENT
Start: 2018-01-06 | End: 2018-01-06

## 2018-01-06 RX ORDER — FLUTICASONE PROPIONATE 50 MCG
2 SPRAY, SUSPENSION (ML) NASAL 2 TIMES DAILY PRN
Qty: 15 G | Refills: 0 | Status: SHIPPED | OUTPATIENT
Start: 2018-01-06

## 2018-01-06 RX ADMIN — CLONIDINE HYDROCHLORIDE 0.1 MG: 0.1 TABLET ORAL at 08:01

## 2018-01-06 NOTE — ED PROVIDER NOTES
Encounter Date: 2018       History     Chief Complaint   Patient presents with    Cough     cough, cold symptoms x 2 days.     Patient is a 32-year-old woman who comes emergency Department with approximately 2 weeks of productive cough and myalgias rhinorrhea and sinus congestion.  Vomiting triaged patient was found the hypertensive with a blood pressure of 198/123.  Patient reports that she is 7 months postpartum and shortly after her pregnancy she was diagnosed with hypertension but that over time her blood pressure improved and her blood pressure medications were discontinued.  Patient also reports taking her blood pressure frequently as most recently as yesterday and her pressure is typically 130/80.  The patient has been taking Mucinex for her symptoms.  She denies headache or chest pain.            Review of patient's allergies indicates:  No Known Allergies  No past medical history on file.  Past Surgical History:   Procedure Laterality Date     SECTION       Family History   Problem Relation Age of Onset    Hypertension Father     Arrhythmia Neg Hx     Cardiomyopathy Neg Hx     Early death Neg Hx     Heart attacks under age 50 Neg Hx     Pacemaker/defibrilator Neg Hx     Premature birth Neg Hx      Social History   Substance Use Topics    Smoking status: Never Smoker    Smokeless tobacco: Never Used    Alcohol use Yes      Comment: occas     Review of Systems   Constitutional: Positive for chills and fatigue. Negative for fever.   HENT: Positive for congestion, rhinorrhea and sinus pressure. Negative for sinus pain and sore throat.    Respiratory: Positive for cough. Negative for chest tightness, shortness of breath and stridor.    Cardiovascular: Negative for chest pain and leg swelling.   Gastrointestinal: Negative for abdominal distention, abdominal pain, diarrhea, nausea and vomiting.   Genitourinary: Negative for dysuria.   Musculoskeletal: Positive for myalgias. Negative for  back pain.   Skin: Negative for rash.   Neurological: Negative for dizziness, tremors, seizures, syncope, facial asymmetry, speech difficulty, weakness, light-headedness, numbness and headaches.   Hematological: Does not bruise/bleed easily.   All other systems reviewed and are negative.      Physical Exam     Initial Vitals [01/06/18 0721]   BP Pulse Resp Temp SpO2   (!) 198/123 85 19 97.7 °F (36.5 °C) 99 %      MAP       148         Physical Exam    Nursing note and vitals reviewed.  Constitutional: Vital signs are normal. She appears well-developed and well-nourished. She is not diaphoretic. No distress.   HENT:   Head: Normocephalic and atraumatic.   Eyes: Conjunctivae and EOM are normal. Pupils are equal, round, and reactive to light.   Neck: Normal range of motion. Neck supple.   Cardiovascular: Normal rate, regular rhythm and normal heart sounds.   Pulmonary/Chest: Breath sounds normal. No respiratory distress. She has no wheezes. She has no rhonchi. She has no rales.   Abdominal: Soft. She exhibits no distension. There is no tenderness. There is no rebound and no guarding.   Musculoskeletal: Normal range of motion. She exhibits no edema or tenderness.   Neurological: She is alert and oriented to person, place, and time. No cranial nerve deficit.   Skin: Skin is warm and dry.   Psychiatric: She has a normal mood and affect.         ED Course   Procedures  Labs Reviewed   CBC W/ AUTO DIFFERENTIAL   COMPREHENSIVE METABOLIC PANEL   TROPONIN I   B-TYPE NATRIURETIC PEPTIDE   INFLUENZA A AND B ANTIGEN   POCT URINE PREGNANCY                       Imaging Results          X-Ray Chest PA And Lateral (Final result)  Result time 01/06/18 08:45:27    Final result by Fercho Curry MD (01/06/18 08:45:27)                 Impression:       No acute process seen.      Electronically signed by: FERCHO CURRY MD  Date:     01/06/18  Time:    08:45              Narrative:    Clinical Data:Cough    Comparison:   none    Findings:  Two view of the chest.      Cardiac silhouette is normal.  The lungs demonstrate no evidence of active disease.  No evidence of pleural effusion or pneumothorax.  Bones appear intact.                            Labs Reviewed   COMPREHENSIVE METABOLIC PANEL - Abnormal; Notable for the following:        Result Value    Total Protein 8.8 (*)     AST 77 (*)     ALT 84 (*)     All other components within normal limits   CBC W/ AUTO DIFFERENTIAL   TROPONIN I   INFLUENZA A AND B ANTIGEN   NT-PRO NATRIURETIC PEPTIDE   PREGNANCY TEST, URINE RAPID   POCT URINE PREGNANCY                ED Course      Clinical Impression:   The primary encounter diagnosis was Acute viral syndrome. A diagnosis of Hypertensive urgency was also pertinent to this visit.                           Sandip Duncan MD  01/06/18 [id number]

## 2018-01-06 NOTE — ED NOTES
PT STATES SHE HAD A BABY IN 07/2017 AND HAD HIGH BLOOD PRESSURE AFTER THE DELIVERY. PT STS THEY TOOK HER OFF THE MEDS AND SHE HAS HAD A NORMAL BLOOD PRESSURE SINCE.

## 2018-07-30 ENCOUNTER — TELEPHONE (OUTPATIENT)
Dept: OBSTETRICS AND GYNECOLOGY | Facility: CLINIC | Age: 33
End: 2018-07-30

## 2019-10-21 ENCOUNTER — TELEPHONE (OUTPATIENT)
Dept: OBSTETRICS AND GYNECOLOGY | Facility: CLINIC | Age: 34
End: 2019-10-21

## 2019-10-21 NOTE — TELEPHONE ENCOUNTER
Called patient, informed patient I was calling because I see she has a Mirena IUD here at the [place] office. Informed patient I was calling to see if she still wanted the device inserted. Patient stated she no longer has insurance but does still want device. Gave patient central pricing number ([phone]) to call and see how much it would cost to for insertion. Patient stated she would call the office back once she gets the price.

## 2020-02-06 ENCOUNTER — TELEPHONE (OUTPATIENT)
Dept: OBSTETRICS AND GYNECOLOGY | Facility: CLINIC | Age: 35
End: 2020-02-06

## 2020-02-06 NOTE — TELEPHONE ENCOUNTER
Had to dispose patient's Mirena device today due to the device being . Device was at the [place] office.  2020.

## 2020-02-27 ENCOUNTER — HOSPITAL ENCOUNTER (EMERGENCY)
Facility: HOSPITAL | Age: 35
Discharge: HOME | End: 2020-02-27
Attending: EMERGENCY MEDICINE

## 2020-02-27 VITALS
WEIGHT: 232 LBS | RESPIRATION RATE: 20 BRPM | SYSTOLIC BLOOD PRESSURE: 178 MMHG | OXYGEN SATURATION: 100 % | HEIGHT: 66 IN | TEMPERATURE: 98 F | HEART RATE: 81 BPM | BODY MASS INDEX: 37.28 KG/M2 | DIASTOLIC BLOOD PRESSURE: 101 MMHG

## 2020-02-27 DIAGNOSIS — H10.9 CONJUNCTIVITIS OF RIGHT EYE, UNSPECIFIED CONJUNCTIVITIS TYPE: Primary | ICD-10-CM

## 2020-02-27 PROCEDURE — 99283 EMERGENCY DEPT VISIT LOW MDM: CPT | Mod: ER

## 2020-02-27 RX ORDER — SULFACETAMIDE SODIUM 100 MG/ML
2 SOLUTION/ DROPS OPHTHALMIC 4 TIMES DAILY
Qty: 1 BOTTLE | Status: SHIPPED | OUTPATIENT
Start: 2020-02-27 | End: 2022-07-11

## 2020-02-27 NOTE — ED PROVIDER NOTES
"Encounter Date: 2020       History     Chief Complaint   Patient presents with    Eye Problem     Pt c/o right eye pain this morning. States "It feels like I have pink eye. My baby just had it."      Patient presents for evaluation of eye redness.  Her child has conjunctivitis.  She woke up this morning with some mattering on the eye.  It itches it is photophobic    The history is provided by the patient.   Eye Problem   This is a new problem. The current episode started 6 to 12 hours ago. The problem has not changed since onset.Pertinent negatives include no chest pain and no shortness of breath. Nothing relieves the symptoms.     Review of patient's allergies indicates:  No Known Allergies  History reviewed. No pertinent past medical history.  Past Surgical History:   Procedure Laterality Date     SECTION       Family History   Problem Relation Age of Onset    Hypertension Father     Arrhythmia Neg Hx     Cardiomyopathy Neg Hx     Early death Neg Hx     Heart attacks under age 50 Neg Hx     Pacemaker/defibrilator Neg Hx     Premature birth Neg Hx      Social History     Tobacco Use    Smoking status: Never Smoker    Smokeless tobacco: Never Used   Substance Use Topics    Alcohol use: Yes     Comment: occas    Drug use: No     Review of Systems   Constitutional: Negative for fever.   HENT: Negative for sore throat.    Eyes: Positive for photophobia, pain, discharge, redness and itching. Negative for visual disturbance.   Respiratory: Negative for shortness of breath.    Cardiovascular: Negative for chest pain.   Gastrointestinal: Negative for nausea.   Genitourinary: Negative for dysuria.   Musculoskeletal: Negative for back pain.   Skin: Negative for rash.   Neurological: Negative for weakness.   Hematological: Does not bruise/bleed easily.       Physical Exam     Initial Vitals [20 1050]   BP Pulse Resp Temp SpO2   (!) 178/101 81 20 98.1 °F (36.7 °C) 100 %      MAP       --     "     Physical Exam    Constitutional: She appears well-developed.   HENT:   Head: Normocephalic.   Eyes: Pupils are equal, round, and reactive to light. Right eye exhibits discharge. No foreign body present in the right eye. Right conjunctiva is injected.   Neck: Normal range of motion.   Neurological: She is alert.   Skin: Skin is warm. Capillary refill takes less than 2 seconds.   Psychiatric: She has a normal mood and affect.         ED Course   Procedures  Labs Reviewed - No data to display       Imaging Results    None          Medical Decision Making:   Differential Diagnosis:   Differential diagnosis includes but is not limited to orbital and periorbital cellulitis as well as conjunctivitis was well as glaucoma  ED Management:  The patient is a pretty classic look of a bacterial conjunctivitis.  There is some matting and lid edema. No hyphema or hypopyon the extraocular movements are intact.                                 Clinical Impression:       ICD-10-CM ICD-9-CM   1. Conjunctivitis of right eye, unspecified conjunctivitis type H10.9 372.30         Disposition:   Disposition: Discharged  Condition: Stable     ED Disposition Condition    Discharge Stable        ED Prescriptions     Medication Sig Dispense Start Date End Date Auth. Provider    sulfacetamide sodium 10% (BLEPH-10) 10 % ophthalmic solution Place 2 drops into both eyes 4 (four) times daily. 1 Bottle 2/27/2020  Reece Stinson DO        Follow-up Information     Follow up With Specialties Details Why Contact Info Additional Information    [facility] Internal Medicine Schedule an appointment as soon as possible for a visit  If symptoms worsen [address]  [facility] [address]  [phone] [facility] dg.                                     Reece Stinson DO  02/27/20 1104

## 2020-04-14 DIAGNOSIS — R05.9 COUGH: Primary | ICD-10-CM

## 2020-04-15 ENCOUNTER — TELEPHONE (OUTPATIENT)
Dept: UROLOGY | Facility: CLINIC | Age: 35
End: 2020-04-15

## 2020-04-15 NOTE — TELEPHONE ENCOUNTER
Patient notified via telephone regarding positive Covid-19 test result. Patient informed that she is contagious and should be on 14 days of quarantine and self-isolation. Wear masks around family members. Patient encouraged to go to the ED if she develops shortness of breath or persistent fever that is not controlled with Tylenol. Patient verbalized understanding.    Katie Geller NP

## 2020-04-16 DIAGNOSIS — U07.1 COVID-19 VIRUS DETECTED: ICD-10-CM

## 2022-07-11 ENCOUNTER — HOSPITAL ENCOUNTER (EMERGENCY)
Facility: HOSPITAL | Age: 37
Discharge: HOME | End: 2022-07-11
Attending: EMERGENCY MEDICINE

## 2022-07-11 VITALS
HEIGHT: 66 IN | WEIGHT: 240 LBS | RESPIRATION RATE: 17 BRPM | DIASTOLIC BLOOD PRESSURE: 89 MMHG | HEART RATE: 88 BPM | SYSTOLIC BLOOD PRESSURE: 183 MMHG | BODY MASS INDEX: 38.57 KG/M2 | TEMPERATURE: 98 F | OXYGEN SATURATION: 100 %

## 2022-07-11 DIAGNOSIS — S05.01XA ABRASION OF RIGHT CORNEA, INITIAL ENCOUNTER: Primary | ICD-10-CM

## 2022-07-11 PROCEDURE — 99283 EMERGENCY DEPT VISIT LOW MDM: CPT | Mod: ER

## 2022-07-11 PROCEDURE — 25000003 PHARM REV CODE 250: Mod: ER | Performed by: EMERGENCY MEDICINE

## 2022-07-11 RX ORDER — ERYTHROMYCIN 5 MG/G
OINTMENT OPHTHALMIC
Status: COMPLETED | OUTPATIENT
Start: 2022-07-11 | End: 2022-07-11

## 2022-07-11 RX ORDER — ERYTHROMYCIN 5 MG/G
OINTMENT OPHTHALMIC
Qty: 3.5 G | Refills: 0 | Status: SHIPPED | OUTPATIENT
Start: 2022-07-11 | End: 2024-01-19

## 2022-07-11 RX ORDER — PROPARACAINE HYDROCHLORIDE 5 MG/ML
1 SOLUTION/ DROPS OPHTHALMIC
Status: COMPLETED | OUTPATIENT
Start: 2022-07-11 | End: 2022-07-11

## 2022-07-11 RX ADMIN — PROPARACAINE HYDROCHLORIDE 1 DROP: 5 SOLUTION/ DROPS OPHTHALMIC at 04:07

## 2022-07-11 RX ADMIN — ERYTHROMYCIN 1 INCH: 5 OINTMENT OPHTHALMIC at 05:07

## 2022-07-11 RX ADMIN — FLUORESCEIN SODIUM 1 EACH: 1 STRIP OPHTHALMIC at 04:07

## 2022-07-11 NOTE — Clinical Note
"Joseph Jimenez" Yulissa was seen and treated in our emergency department on 7/11/2022.  She may return to work on 07/12/2022.       If you have any questions or concerns, please don't hesitate to call.      Abdoulaye MORGAN RN    "

## 2022-07-11 NOTE — ED PROVIDER NOTES
Encounter Date: 2022       History     Chief Complaint   Patient presents with    Eye Pain     Right eye pain, blurred vision since Thursday. Denies known cause     Patient currently presents with a chief complaint of eye pain.  This is localized to the right eye.  This began 3 days ago.  Patient denies apparent trauma to the effected eye.  There is not suspected foreign body.  There is associated increase in tearing.  There are possible associated visual changes although the patient is unsure given she does not have her glasses available.  Patient does not wear contacts.          Review of patient's allergies indicates:  No Known Allergies  No past medical history on file.  Past Surgical History:   Procedure Laterality Date     SECTION       Family History   Problem Relation Age of Onset    Hypertension Father     Arrhythmia Neg Hx     Cardiomyopathy Neg Hx     Early death Neg Hx     Heart attacks under age 50 Neg Hx     Pacemaker/defibrilator Neg Hx     Premature birth Neg Hx      Social History     Tobacco Use    Smoking status: Never Smoker    Smokeless tobacco: Never Used   Substance Use Topics    Alcohol use: Yes     Comment: occas    Drug use: No     Review of Systems   Constitutional: Negative for chills and fever.   HENT: Negative for congestion and rhinorrhea.    Eyes: Positive for photophobia, pain and redness.   Respiratory: Negative for cough and shortness of breath.    Cardiovascular: Negative for chest pain and palpitations.   Gastrointestinal: Negative for abdominal pain, diarrhea and vomiting.   Genitourinary: Negative for dysuria.   Skin: Negative for color change and rash.   Allergic/Immunologic: Negative for immunocompromised state.   Neurological: Negative for dizziness and light-headedness.   Hematological: Negative for adenopathy. Does not bruise/bleed easily.     Physical Exam     Initial Vitals [22 0440]   BP Pulse Resp Temp SpO2   (!) 183/89 88 17 98 °F (36.7  °C) 100 %      MAP       --           Physical Exam    Nursing note and vitals reviewed.  Constitutional: She appears well-developed and well-nourished. She is not diaphoretic. No distress.   HENT:   Head: Normocephalic and atraumatic.   Eyes: EOM and lids are normal. Pupils are equal, round, and reactive to light. Right conjunctiva is injected. Right conjunctiva has no hemorrhage. Left conjunctiva is not injected. Left conjunctiva has no hemorrhage.   Fundoscopic exam:       The right eye shows no hemorrhage.   Slit lamp exam:       The right eye shows corneal abrasion and fluorescein uptake. The right eye shows no corneal flare, no corneal ulcer, no foreign body, no hyphema and no hypopyon.   Notably there does appear to be some adherent tissue to the surface of the cornea at the site of the abrasion.  This is concerning for either keratitis or possibly iaderent corneal tissue    IOP R 22   Cardiovascular: Normal rate, regular rhythm, normal heart sounds and intact distal pulses.   Pulmonary/Chest: Breath sounds normal. No respiratory distress.     Neurological: She is alert and oriented to person, place, and time.   Skin: Skin is warm and dry.       ED Course   Procedures  Labs Reviewed - No data to display       Imaging Results    None          Medications   proparacaine 0.5 % ophthalmic solution 1 drop (1 drop Right Eye Given 7/11/22 0447)   fluorescein ophthalmic strip 1 each (1 each Left Eye Given 7/11/22 0447)   erythromycin 5 mg/gram (0.5 %) ophthalmic ointment (1 inch Right Eye Given 7/11/22 0515)     Medical Decision Making:   ED Management:  We have started the patient on a course of erythromycin ointment pending her ability to follow-up with ophthalmology.  We have no instructed her to follow up with the ophthalmologist later this morning for further evaluation.  All findings were reviewed with the patient/family in detail.  I see no indication of an emergent process beyond that addressed during our  encounter but have duly counseled the patient/family regarding the need for prompt follow-up as well as the indications that should prompt immediate return to the emergency room should new or worrisome developments occur.  The patient has additionally been provided with printed information regarding diagnosis as well as instructions regarding follow up and any medications intended to manage the patient's aforementioned conditions.  The patient/family communicates understanding of all this information and all remaining questions and concerns were addressed at this time.        Addendum:  I was able to follow-up with the patient via telephone.  She notes that she was able to follow-up promptly with the ophthalmologist this morning Dr Sharp.  She notes that he did confirm the presence of a corneal abrasion as well as some adherent corneal tissue.  She notes she does feeling much better this evening and has no new complaints at this time.                      Clinical Impression:   Final diagnoses:  [S05.01XA] Abrasion of right cornea, initial encounter (Primary)          ED Disposition Condition    Discharge Stable        ED Prescriptions     Medication Sig Dispense Start Date End Date Auth. Provider    erythromycin (ROMYCIN) ophthalmic ointment Place a 1/2 inch ribbon of ointment into the lower eyelid 4 times daily for one week. 3.5 g 7/11/2022  Ryan Chery MD        Follow-up Information     Follow up With Specialties Details Why Contact [facility] - Emergency Dept Emergency Medicine Go to  As needed, If symptoms worsen [address] [address] [phone]    Sebastian Peterson MD Ophthalmology Go today  [address] [place] [address]  [place] [address]  [phone]             Ryan Chery MD  07/11/22 1901

## 2022-07-11 NOTE — Clinical Note
"Joseph Jimenez" Yulissa was seen and treated in our emergency department on 7/11/2022.  She may return to work on 07/13/2022.       If you have any questions or concerns, please don't hesitate to call.      Abdoulaye MORGAN RN    "

## 2023-08-29 NOTE — ANESTHESIA PREPROCEDURE EVALUATION
2017  Joseph Duenas is a 31 y.o., female for a      Review of patient's allergies indicates:  No Known Allergies    No past medical history on file.  Past Surgical History:   Procedure Laterality Date     SECTION       Patient Active Problem List   Diagnosis    HTN complicating peripregnancy, antepartum    BMI 40.0-44.9, adult    Current maternal condition affecting pregnancy    36 weeks gestation of pregnancy         Anesthesia Evaluation    I have reviewed the Patient Summary Reports.        Review of Systems    Wt Readings from Last 3 Encounters:   17 132.5 kg (292 lb)   17 132.5 kg (292 lb)   17 131.1 kg (289 lb)     Temp Readings from Last 3 Encounters:   17 36.8 °C (98.3 °F) (Oral)   03/28/15 36.6 °C (97.9 °F) (Oral)   03/24/15 36.8 °C (98.3 °F) (Oral)     BP Readings from Last 3 Encounters:   17 (!) 150/95   17 (!) 146/62   17 122/70     Pulse Readings from Last 3 Encounters:   17 94   17 107   17 110     Recent Results (from the past 336 hour(s))   CBC auto differential    Collection Time: 17  6:25 AM   Result Value Ref Range    WBC 10.47 3.90 - 12.70 K/uL    Hemoglobin 9.4 (L) 12.0 - 16.0 g/dL    Hematocrit 29.3 (L) 37.0 - 48.5 %    Platelets 173 150 - 350 K/uL   CBC auto differential    Collection Time: 17  8:34 AM   Result Value Ref Range    WBC 10.30 3.90 - 12.70 K/uL    Hemoglobin 10.0 (L) 12.0 - 16.0 g/dL    Hematocrit 31.8 (L) 37.0 - 48.5 %    Platelets 173 150 - 350 K/uL       Physical Exam  General:  Well nourished, Obesity    Airway/Jaw/Neck:  Airway Findings: Mouth Opening: Normal Tongue: Normal  General Airway Assessment: Adult  Mallampati: II  Improves to II with phonation.  TM Distance: Normal, at least 6 cm       Chest/Lungs:  Chest/Lungs Findings: Clear to auscultation, Normal  Respiratory Rate     Heart/Vascular:  Heart Findings: Rate: Normal  Rhythm: Regular Rhythm  Sounds: Normal        Mental Status:  Mental Status Findings:  Cooperative, Alert and Oriented         Anesthesia Plan  Type of Anesthesia, risks & benefits discussed:  Anesthesia Type:  epidural  Patient's Preference:   Intra-op Monitoring Plan:   Intra-op Monitoring Plan Comments:   Post Op Pain Control Plan:   Post Op Pain Control Plan Comments:   Induction:   IV  Beta Blocker:         Informed Consent: Patient understands risks and agrees with Anesthesia plan.  Questions answered. Anesthesia consent signed with patient.  ASA Score: 2     Day of Surgery Review of History & Physical: I have interviewed and examined the patient. I have reviewed the patient's H&P dated:  There are no significant changes.  H&P update referred to the provider.  H&P completed by Anesthesiologist.       Ready For Surgery From Anesthesia Perspective.        No

## 2023-09-06 ENCOUNTER — OFFICE VISIT (OUTPATIENT)
Dept: OBSTETRICS AND GYNECOLOGY | Facility: CLINIC | Age: 38
End: 2023-09-06
Payer: MEDICAID

## 2023-09-06 VITALS — DIASTOLIC BLOOD PRESSURE: 97 MMHG | BODY MASS INDEX: 40.19 KG/M2 | WEIGHT: 249 LBS | SYSTOLIC BLOOD PRESSURE: 153 MMHG

## 2023-09-06 DIAGNOSIS — O16.1 HYPERTENSION AFFECTING PREGNANCY IN FIRST TRIMESTER: ICD-10-CM

## 2023-09-06 DIAGNOSIS — Z32.01 POSITIVE URINE PREGNANCY TEST: ICD-10-CM

## 2023-09-06 DIAGNOSIS — N91.2 AMENORRHEA: Primary | ICD-10-CM

## 2023-09-06 LAB
B-HCG UR QL: POSITIVE
CTP QC/QA: YES

## 2023-09-06 PROCEDURE — 99999PBSHW POCT URINE PREGNANCY: Mod: PBBFAC,,,

## 2023-09-06 PROCEDURE — 99205 PR OFFICE/OUTPT VISIT, NEW, LEVL V, 60-74 MIN: ICD-10-PCS | Mod: S$PBB,TH,, | Performed by: OBSTETRICS & GYNECOLOGY

## 2023-09-06 PROCEDURE — 3008F BODY MASS INDEX DOCD: CPT | Mod: CPTII,,, | Performed by: OBSTETRICS & GYNECOLOGY

## 2023-09-06 PROCEDURE — 87086 URINE CULTURE/COLONY COUNT: CPT | Performed by: OBSTETRICS & GYNECOLOGY

## 2023-09-06 PROCEDURE — 1159F MED LIST DOCD IN RCRD: CPT | Mod: CPTII,,, | Performed by: OBSTETRICS & GYNECOLOGY

## 2023-09-06 PROCEDURE — 87591 N.GONORRHOEAE DNA AMP PROB: CPT | Performed by: OBSTETRICS & GYNECOLOGY

## 2023-09-06 PROCEDURE — 3080F PR MOST RECENT DIASTOLIC BLOOD PRESSURE >= 90 MM HG: ICD-10-PCS | Mod: CPTII,,, | Performed by: OBSTETRICS & GYNECOLOGY

## 2023-09-06 PROCEDURE — 1160F PR REVIEW ALL MEDS BY PRESCRIBER/CLIN PHARMACIST DOCUMENTED: ICD-10-PCS | Mod: CPTII,,, | Performed by: OBSTETRICS & GYNECOLOGY

## 2023-09-06 PROCEDURE — 3080F DIAST BP >= 90 MM HG: CPT | Mod: CPTII,,, | Performed by: OBSTETRICS & GYNECOLOGY

## 2023-09-06 PROCEDURE — 88175 CYTOPATH C/V AUTO FLUID REDO: CPT | Performed by: OBSTETRICS & GYNECOLOGY

## 2023-09-06 PROCEDURE — 3008F PR BODY MASS INDEX (BMI) DOCUMENTED: ICD-10-PCS | Mod: CPTII,,, | Performed by: OBSTETRICS & GYNECOLOGY

## 2023-09-06 PROCEDURE — 1159F PR MEDICATION LIST DOCUMENTED IN MEDICAL RECORD: ICD-10-PCS | Mod: CPTII,,, | Performed by: OBSTETRICS & GYNECOLOGY

## 2023-09-06 PROCEDURE — 81025 URINE PREGNANCY TEST: CPT | Mod: PBBFAC,PO | Performed by: OBSTETRICS & GYNECOLOGY

## 2023-09-06 PROCEDURE — 87186 SC STD MICRODIL/AGAR DIL: CPT | Performed by: OBSTETRICS & GYNECOLOGY

## 2023-09-06 PROCEDURE — 87077 CULTURE AEROBIC IDENTIFY: CPT | Performed by: OBSTETRICS & GYNECOLOGY

## 2023-09-06 PROCEDURE — 1160F RVW MEDS BY RX/DR IN RCRD: CPT | Mod: CPTII,,, | Performed by: OBSTETRICS & GYNECOLOGY

## 2023-09-06 PROCEDURE — 99999PBSHW POCT URINE PREGNANCY: ICD-10-PCS | Mod: PBBFAC,,,

## 2023-09-06 PROCEDURE — 99999 PR PBB SHADOW E&M-EST. PATIENT-LVL III: ICD-10-PCS | Mod: PBBFAC,,, | Performed by: OBSTETRICS & GYNECOLOGY

## 2023-09-06 PROCEDURE — 99205 OFFICE O/P NEW HI 60 MIN: CPT | Mod: S$PBB,TH,, | Performed by: OBSTETRICS & GYNECOLOGY

## 2023-09-06 PROCEDURE — 81514 NFCT DS BV&VAGINITIS DNA ALG: CPT | Performed by: OBSTETRICS & GYNECOLOGY

## 2023-09-06 PROCEDURE — 3077F PR MOST RECENT SYSTOLIC BLOOD PRESSURE >= 140 MM HG: ICD-10-PCS | Mod: CPTII,,, | Performed by: OBSTETRICS & GYNECOLOGY

## 2023-09-06 PROCEDURE — 99213 OFFICE O/P EST LOW 20 MIN: CPT | Mod: PBBFAC,TH,PO | Performed by: OBSTETRICS & GYNECOLOGY

## 2023-09-06 PROCEDURE — 3077F SYST BP >= 140 MM HG: CPT | Mod: CPTII,,, | Performed by: OBSTETRICS & GYNECOLOGY

## 2023-09-06 PROCEDURE — 99999 PR PBB SHADOW E&M-EST. PATIENT-LVL III: CPT | Mod: PBBFAC,,, | Performed by: OBSTETRICS & GYNECOLOGY

## 2023-09-06 PROCEDURE — 87088 URINE BACTERIA CULTURE: CPT | Performed by: OBSTETRICS & GYNECOLOGY

## 2023-09-06 RX ORDER — NIFEDIPINE 30 MG/1
30 TABLET, EXTENDED RELEASE ORAL DAILY
Qty: 30 TABLET | Refills: 11 | Status: SHIPPED | OUTPATIENT
Start: 2023-09-06 | End: 2024-09-05

## 2023-09-06 NOTE — PROGRESS NOTES
"CC: Annual check-up    SUBJECTIVE:   38 y.o. female   for annual routine Pap and checkup. Patient's last menstrual period was 2023..  She has no unusual complaints and missed ocp's in may .    Has CHTN, stopped norvasc, BP today 153/97, no pre e sx's  Had Pre E with 1st pregnancy but not 2nd    History reviewed. No pertinent past medical history.  Past Surgical History:   Procedure Laterality Date     SECTION       Social History     Socioeconomic History    Marital status: Single   Tobacco Use    Smoking status: Never    Smokeless tobacco: Never   Substance and Sexual Activity    Alcohol use: Yes     Comment: occas    Drug use: No    Sexual activity: Not Currently     Partners: Male     Family History   Problem Relation Age of Onset    Hypertension Father     Arrhythmia Neg Hx     Cardiomyopathy Neg Hx     Early death Neg Hx     Heart attacks under age 50 Neg Hx     Pacemaker/defibrilator Neg Hx     Premature birth Neg Hx      OB History    Para Term  AB Living   4 2 2   2 1   SAB IAB Ectopic Multiple Live Births   1     0 1      # Outcome Date GA Lbr Paul/2nd Weight Sex Delivery Anes PTL Lv   4 Term 17 38w6d  3.816 kg (8 lb 6.6 oz) M CS-LTranv EPI N    3 AB            2 SAB            1 Term 07 39w0d  3.884 kg (8 lb 9 oz) M CS-LTranv  N RODOLFO      Birth Comments: per pt "toxemia at end of pregnancy"         Current Outpatient Medications   Medication Sig Dispense Refill    erythromycin (ROMYCIN) ophthalmic ointment Place a 1/2 inch ribbon of ointment into the lower eyelid 4 times daily for one week. (Patient not taking: Reported on 2023) 3.5 g 0    fluticasone (FLONASE) 50 mcg/actuation nasal spray 2 sprays (100 mcg total) by Each Nare route 2 (two) times daily as needed. (Patient not taking: Reported on 2023) 15 g 0    nifedipine (PROCARDIA XL) 30 MG (OSM) 24 hr tablet Take 30 mg by mouth once daily.      NIFEdipine (PROCARDIA-XL) 30 MG (OSM) 24 hr " tablet Take 1 tablet (30 mg total) by mouth once daily. 30 tablet 11    norgestimate-ethinyl estradiol (ORTHO TRI-CYCLEN LO) 0.18/0.215/0.25 mg-25 mcg tablet Take 1 tablet by mouth once daily. 30 tablet 11     No current facility-administered medications for this visit.     Allergies: Patient has no known allergies.     ROS:  Constitutional: no weight loss, weight gain, fever, fatigue  Eyes:  No vision changes, glasses/contacts  ENT/Mouth: No ulcers, sinus problems, ears ringing, headache  Cardiovascular: No inability to lie flat, chest pain, exercise intolerance, swelling, heart palpitations  Respiratory: No wheezing, coughing blood, shortness of breath, or cough  Gastrointestinal: No diarrhea, bloody stool, nausea/vomiting, constipation, gas, hemorrhoids  Genitourinary: No blood in urine, painful urination, urgency of urination, frequency of urination, incomplete emptying, incontinence, abnormal bleeding, painful periods, heavy periods, vaginal discharge, vaginal odor, painful intercourse, sexual problems, bleeding after intercourse.  Musculoskeletal: No muscle weakness  Skin/Breast: No painful breasts, nipple discharge, masses, rash, ulcers  Neurological: No passing out, seizures, numbness, headache  Endocrine: No diabetes, hypothyroid, hyperthyroid, hot flashes, hair loss, abnormal hair growth, ance  Psychiatric: No depression, crying  Hematologic: No bruises, bleeding, swollen lymph nodes, anemia.      OBJECTIVE:   The patient appears well, alert, oriented x 3, in no distress.  BP (!) 153/97   Wt 112.9 kg (249 lb)   LMP 06/05/2023   BMI 40.19 kg/m²   NECK: no thyromegaly, trachea midline  SKIN: no acne, striae, hirsutism  BREAST EXAM: not examined  ABDOMEN: no hernias, masses, or hepatosplenomegaly  GENITALIA: normal external genitalia, no erythema, no discharge  URETHRA: normal urethra, normal urethral meatus  VAGINA: Normal  CERVIX: no lesions or cervical motion tenderness  UTERUS: enlarged, 20 weeks size  ?fibroid?  ADNEXA: normal adnexa and no mass, fullness, tenderness  Uy/s done IUP with FHt's 140's est 12-14 wk size    ASSESSMENT:   well woman  1. Amenorrhea    2. Hypertension affecting pregnancy in first trimester    3. Positive urine pregnancy test        PLAN:   pap smear  additional lab tests per orders  return annually or prn  Orders Placed This Encounter    Vaginosis Screen by DNA Probe    C. trachomatis/N. gonorrhoeae by AMP DNA [facility]; Cervix    Urine culture    US OB <14 Wks TransAbd & TransVag, Single Gestation (XPD)    Rubella Antibody, IgG    RPR    Hepatitis C Antibody    HIV 1/2 Ag/Ab (4th Gen)    Hepatitis B Surface Antigen    CBC Auto Differential    POCT urine pregnancy    Type & Screen - Ob Profile    Liquid-Based Pap Smear, Screening    NIFEdipine (PROCARDIA-XL) 30 MG (OSM) 24 hr tablet     M-21 paper given

## 2023-09-07 ENCOUNTER — HOSPITAL ENCOUNTER (EMERGENCY)
Facility: HOSPITAL | Age: 38
Discharge: HOME | End: 2023-09-07
Attending: EMERGENCY MEDICINE
Payer: MEDICAID

## 2023-09-07 ENCOUNTER — TELEPHONE (OUTPATIENT)
Dept: OBSTETRICS AND GYNECOLOGY | Facility: CLINIC | Age: 38
End: 2023-09-07
Payer: MEDICAID

## 2023-09-07 VITALS
HEART RATE: 89 BPM | OXYGEN SATURATION: 99 % | TEMPERATURE: 98 F | SYSTOLIC BLOOD PRESSURE: 190 MMHG | WEIGHT: 249 LBS | BODY MASS INDEX: 40.02 KG/M2 | HEIGHT: 66 IN | RESPIRATION RATE: 19 BRPM | DIASTOLIC BLOOD PRESSURE: 85 MMHG

## 2023-09-07 DIAGNOSIS — B02.9 HERPES ZOSTER WITHOUT COMPLICATION: Primary | ICD-10-CM

## 2023-09-07 DIAGNOSIS — O16.1 ELEVATED BLOOD PRESSURE AFFECTING PREGNANCY IN FIRST TRIMESTER, ANTEPARTUM: ICD-10-CM

## 2023-09-07 LAB
BACTERIAL VAGINOSIS DNA: NEGATIVE
BILIRUB UR QL STRIP: NEGATIVE
C TRACH DNA SPEC QL NAA+PROBE: NOT DETECTED
CANDIDA GLABRATA DNA: NEGATIVE
CANDIDA KRUSEI DNA: NEGATIVE
CANDIDA RRNA VAG QL PROBE: NEGATIVE
CLARITY UR REFRACT.AUTO: CLEAR
COLOR UR AUTO: YELLOW
GLUCOSE UR QL STRIP: NEGATIVE
HGB UR QL STRIP: ABNORMAL
KETONES UR QL STRIP: NEGATIVE
LEUKOCYTE ESTERASE UR QL STRIP: NEGATIVE
N GONORRHOEA DNA SPEC QL NAA+PROBE: NOT DETECTED
NITRITE UR QL STRIP: NEGATIVE
PH UR STRIP: 7 [PH] (ref 5–8)
PROT UR QL STRIP: NEGATIVE
SP GR UR STRIP: <=1.005 (ref 1–1.03)
T VAGINALIS RRNA GENITAL QL PROBE: NEGATIVE
URN SPEC COLLECT METH UR: ABNORMAL
UROBILINOGEN UR STRIP-ACNC: NEGATIVE EU/DL

## 2023-09-07 PROCEDURE — 99283 EMERGENCY DEPT VISIT LOW MDM: CPT | Mod: ER

## 2023-09-07 PROCEDURE — 81003 URINALYSIS AUTO W/O SCOPE: CPT | Mod: ER | Performed by: NURSE PRACTITIONER

## 2023-09-07 RX ORDER — ACYCLOVIR 800 MG/1
800 TABLET ORAL
Qty: 40 TABLET | Refills: 0 | Status: SHIPPED | OUTPATIENT
Start: 2023-09-07 | End: 2024-09-06

## 2023-09-07 RX ORDER — NITROFURANTOIN 25; 75 MG/1; MG/1
100 CAPSULE ORAL 2 TIMES DAILY
Qty: 14 CAPSULE | Refills: 0 | Status: SHIPPED | OUTPATIENT
Start: 2023-09-07 | End: 2023-09-14

## 2023-09-07 NOTE — ED PROVIDER NOTES
Encounter Date: 2023       History     Chief Complaint   Patient presents with    Rash     Painful rash to mid back that began 3 days ago .  13 weeks pregnant.     Patient is a  female presenting to the emergency department for a painful rash x3 days.  Patient reports that she initially felt pain in the right side of her middle back.  Yesterday, she reports the development of pustules on the right side of her back.  Patient describes the pain as a 9/10 and burning.  Patient reports no fever no headache, no dizziness, no swelling of her feet.  Patient reports no changes in urinary habits.  Patient reports no vaginal bleeding or abdominal pain.  Patient was seen by her OB gyn yesterday and was prescribed nifedipine, but patient reports that she has not had a chance to take that medication.     The history is provided by the patient.     Review of patient's allergies indicates:  No Known Allergies  No past medical history on file.  Past Surgical History:   Procedure Laterality Date     SECTION       Family History   Problem Relation Age of Onset    Hypertension Father     Arrhythmia Neg Hx     Cardiomyopathy Neg Hx     Early death Neg Hx     Heart attacks under age 50 Neg Hx     Pacemaker/defibrilator Neg Hx     Premature birth Neg Hx      Social History     Tobacco Use    Smoking status: Never    Smokeless tobacco: Never   Substance Use Topics    Alcohol use: Yes     Comment: occas    Drug use: No     Review of Systems   Constitutional:  Negative for activity change, appetite change, chills, fatigue and fever.   HENT:  Negative for facial swelling.    Eyes:  Negative for visual disturbance.   Respiratory:  Negative for shortness of breath.    Cardiovascular:  Negative for chest pain and leg swelling.   Gastrointestinal:  Negative for abdominal pain, constipation, diarrhea, nausea and vomiting.   Genitourinary:  Negative for difficulty urinating, dysuria and urgency.   Musculoskeletal:  Positive for  back pain.   Skin:  Positive for rash.   Neurological:  Negative for dizziness and headaches.   All other systems reviewed and are negative.      Physical Exam     Initial Vitals [09/07/23 0939]   BP Pulse Resp Temp SpO2   (!) 174/104 102 20 98.3 °F (36.8 °C) 100 %      MAP       --         Physical Exam    Nursing note and vitals reviewed.  Constitutional: She appears well-developed and well-nourished. She is not diaphoretic. No distress.   HENT:   Head: Normocephalic and atraumatic.   Right Ear: External ear normal.   Left Ear: External ear normal.   Nose: Nose normal.   Mouth/Throat: Oropharynx is clear and moist.   Eyes: Conjunctivae and EOM are normal.   Neck:   Normal range of motion.  Cardiovascular:  Normal rate, regular rhythm and normal heart sounds.           Pulmonary/Chest: Breath sounds normal. No respiratory distress.           Abdominal: Abdomen is soft. Bowel sounds are normal.   Musculoskeletal:         General: No edema. Normal range of motion.      Cervical back: Normal range of motion.     Neurological: She is alert and oriented to person, place, and time.   Skin: Skin is warm. Capillary refill takes less than 2 seconds. Rash noted.   Psychiatric: She has a normal mood and affect. Thought content normal.         ED Course   Procedures  Labs Reviewed   URINALYSIS, REFLEX TO URINE CULTURE - Abnormal; Notable for the following components:       Result Value    Occult Blood UA Trace (*)     All other components within normal limits    Narrative:     Preferred Collection Type->Urine, Clean Catch  Specimen Source->Urine          Imaging Results    None          Medications - No data to display  Medical Decision Making  Urgent evaluation of a 38-year-old female presenting to the emergency department for painful rash times 1 day.  Patient is hypertensive, afebrile, tachycardic, well-appearing.  On exam,  there is cluster of vesicles right of the midline of her inferior thoracic spine.  Vesicles do not  cross midline. Patient not showing any signs preeclampsia - no headaches, dizziness, nausea, vomiting, vision changes, swelling of the extremities. Patient is only 13 weeks gestation so pre-eclampsia is less likely to occur. Patient's blood pressure is elevated.  Patient reports not being able to take her blood pressure medication today.  Patient will be picking up her blood pressure medication after leaving the ER. Patient discussed with Dr. Hopson who agrees that the patient is able to be discharged to  her blood pressure medicine emergency department. Short course of steroids considered, but no done secondary to elevated blood pressure readings. Patient should follow up with Dr. Izquierdo for her BP.     My overall impression is shingles. I have considered other differential diagnoses, but at this time do not suspect: Urticaria, drug eruption, SJS, viral examthem    ED course: as documented    Discharge Meds/Instructions: acyclovir;  BP medication    There does not appear to be any indication for further emergent testing, observation, or hospitalization at this time.  Patient appears stable for and is comfortable with discharge home. Patient will be getting BP medication upon discharge. The diagnosis, treatment plan, instructions for follow-up as well as ED return precautions were discussed. Advised to follow-up with PCP for outpatient follow-up in 2-3 days. Signs and symptoms that would warrant immediate return to ED were reviewed prior to discharge. All questions and concerns were asked, answered, and addressed. Patient expressed understanding and agreement with the plan.     This case was discussed with Dr. Hopson who is in agreement with my assessment and plan.      Amount and/or Complexity of Data Reviewed  Labs: ordered.     Details: Urinalysis within normal limits.    Risk  Prescription drug management.              Attending Attestation:     Physician Attestation Statement for NP/PA:   I  have directed and reviewed the workup performed by the PA/NP.  I performed the substantive portion of the medical decision making.     Other NP/PA Attestation Additions:      Medical Decision Making: Agree with assessment and plan.                             Clinical Impression:   Final diagnoses:  [O16.1] Elevated blood pressure affecting pregnancy in first trimester, antepartum  [B02.9] Herpes zoster without complication (Primary)        ED Disposition Condition    Discharge Stable          ED Prescriptions       Medication Sig Dispense Start Date End Date Auth. Provider    acyclovir (ZOVIRAX) 800 MG Tab Take 1 tablet (800 mg total) by mouth 5 (five) times daily. 40 tablet 9/7/2023 9/6/2024 Raquel Payne PA-C          Follow-up Information    None          Raquel Payne PA-C  09/07/23 1238       Bakari Hopson MD  09/07/23 1253

## 2023-09-07 NOTE — FIRST PROVIDER EVALUATION
Emergency Department TeleTriage Encounter Note      CHIEF COMPLAINT    Chief Complaint   Patient presents with    Rash     Painful rash to mid back that began 3 days ago .  13 weeks pregnant.       VITAL SIGNS   Initial Vitals [09/07/23 0939]   BP Pulse Resp Temp SpO2   (!) 174/104 102 20 98.3 °F (36.8 °C) 100 %      MAP       --            ALLERGIES    Review of patient's allergies indicates:  No Known Allergies    PROVIDER TRIAGE NOTE  Verbal consent for the teletriage evaluation was given by the patient at the start of the evaluation.  All efforts will be made to maintain patient's privacy during the evaluation.      This is a teletriage evaluation of a 38 y.o. female presenting to the ED with c/o rash to right upper back that is painful. Thinks she has shingles.  Approximately 13 weeks pregnant with no pregnancy complaints. Limited physical exam via telehealth: The patient is awake, alert, answering questions appropriately and is not in respiratory distress.  As the Teletriage provider, I performed an initial assessment and ordered appropriate labs and imaging studies, if any, to facilitate the patient's care once placed in the ED. Once a room is available, care and a full evaluation will be completed by an alternate ED provider.  Any additional orders and the final disposition will be determined by that provider.  All imaging and labs will not be followed-up by the Teletriage Team, including myself.          ORDERS  Labs Reviewed - No data to display    ED Orders (720h ago, onward)      Start Ordered     Status Ordering Provider    09/07/23 1001 09/07/23 1000  Urinalysis, Reflex to Urine Culture Urine, Clean Catch  STAT         Ordered TRENTON HARRELL    09/07/23 1001 09/07/23 1000  Assess fetal heart tones  Until discontinued         Ordered TRENTON HARRELL              Virtual Visit Note: The provider triage portion of this emergency department evaluation and documentation was performed via [employer], a  HIPAA-compliant telemedicine application, in concert with a tele-presenter in the room. A face to face patient evaluation with one of my colleagues will occur once the patient is placed in an emergency department room.      DISCLAIMER: This note was prepared with [employer] voice recognition transcription software. Garbled syntax, mangled pronouns, and other bizarre constructions may be attributed to that software system.

## 2023-09-08 ENCOUNTER — PATIENT MESSAGE (OUTPATIENT)
Dept: OBSTETRICS AND GYNECOLOGY | Facility: CLINIC | Age: 38
End: 2023-09-08
Payer: MEDICAID

## 2023-09-08 LAB — BACTERIA UR CULT: ABNORMAL

## 2023-09-08 NOTE — TELEPHONE ENCOUNTER
Attempted to contact patient in regards to results. No answer and voicemail full.  Sent [employer] message.

## 2023-09-13 ENCOUNTER — TELEPHONE (OUTPATIENT)
Dept: OBSTETRICS AND GYNECOLOGY | Facility: CLINIC | Age: 38
End: 2023-09-13
Payer: MEDICAID

## 2023-09-13 NOTE — TELEPHONE ENCOUNTER
----- Message from Shelby Bedolla sent at 9/12/2023 10:14 AM CDT -----  Regarding: lab?  Contact: [phone]  Patient is requesting a call back regarding getting orders to check the sex of her baby today.   Would the patient rather a call back or a response via [facility]?  Call   Best Call Back Number:  [phone]   Additional Information:  She is taking her orders to [facility] today

## 2023-09-29 ENCOUNTER — LAB VISIT (OUTPATIENT)
Dept: LAB | Facility: HOSPITAL | Age: 38
End: 2023-09-29
Attending: OBSTETRICS & GYNECOLOGY
Payer: MEDICAID

## 2023-09-29 ENCOUNTER — TELEPHONE (OUTPATIENT)
Dept: MATERNAL FETAL MEDICINE | Facility: CLINIC | Age: 38
End: 2023-09-29
Payer: MEDICAID

## 2023-09-29 ENCOUNTER — ROUTINE PRENATAL (OUTPATIENT)
Dept: OBSTETRICS AND GYNECOLOGY | Facility: CLINIC | Age: 38
End: 2023-09-29
Payer: MEDICAID

## 2023-09-29 VITALS
DIASTOLIC BLOOD PRESSURE: 91 MMHG | SYSTOLIC BLOOD PRESSURE: 141 MMHG | BODY MASS INDEX: 40.85 KG/M2 | WEIGHT: 253.06 LBS

## 2023-09-29 DIAGNOSIS — O16.2 HYPERTENSION AFFECTING PREGNANCY IN SECOND TRIMESTER: ICD-10-CM

## 2023-09-29 DIAGNOSIS — Z3A.16 16 WEEKS GESTATION OF PREGNANCY: Primary | ICD-10-CM

## 2023-09-29 DIAGNOSIS — Z3A.16 16 WEEKS GESTATION OF PREGNANCY: ICD-10-CM

## 2023-09-29 LAB
ALBUMIN SERPL BCP-MCNC: 3.7 G/DL (ref 3.5–5.2)
ALP SERPL-CCNC: 58 U/L (ref 38–126)
ALT SERPL W/O P-5'-P-CCNC: 25 U/L (ref 10–44)
ANION GAP SERPL CALC-SCNC: 11 MMOL/L (ref 8–16)
AST SERPL-CCNC: 22 U/L (ref 15–46)
BILIRUB SERPL-MCNC: 0.4 MG/DL (ref 0.1–1)
CALCIUM SERPL-MCNC: 8.7 MG/DL (ref 8.7–10.5)
CHLORIDE SERPL-SCNC: 105 MMOL/L (ref 95–110)
CO2 SERPL-SCNC: 21 MMOL/L (ref 23–29)
CREAT SERPL-MCNC: 0.44 MG/DL (ref 0.5–1.4)
CREAT UR-MCNC: 78 MG/DL (ref 15–325)
EST. GFR  (NO RACE VARIABLE): >60 ML/MIN/1.73 M^2
GLUCOSE SERPL-MCNC: 81 MG/DL (ref 70–110)
POTASSIUM SERPL-SCNC: 3.8 MMOL/L (ref 3.5–5.1)
PROT SERPL-MCNC: 7.8 G/DL (ref 6–8.4)
PROT UR-MCNC: 8 MG/DL (ref 0–15)
PROT/CREAT UR: 0.1 MG/G{CREAT} (ref 0–0.2)
SODIUM SERPL-SCNC: 137 MMOL/L (ref 136–145)
UUN UR-MCNC: 9 MG/DL (ref 7–17)

## 2023-09-29 PROCEDURE — 80053 COMPREHEN METABOLIC PANEL: CPT | Mod: PN | Performed by: OBSTETRICS & GYNECOLOGY

## 2023-09-29 PROCEDURE — 99999 PR PBB SHADOW E&M-EST. PATIENT-LVL III: CPT | Mod: PBBFAC,,, | Performed by: OBSTETRICS & GYNECOLOGY

## 2023-09-29 PROCEDURE — 36415 COLL VENOUS BLD VENIPUNCTURE: CPT | Mod: PN | Performed by: OBSTETRICS & GYNECOLOGY

## 2023-09-29 PROCEDURE — 84156 ASSAY OF PROTEIN URINE: CPT | Performed by: OBSTETRICS & GYNECOLOGY

## 2023-09-29 PROCEDURE — 99213 PR OFFICE/OUTPT VISIT, EST, LEVL III, 20-29 MIN: ICD-10-PCS | Mod: TH,S$PBB,, | Performed by: OBSTETRICS & GYNECOLOGY

## 2023-09-29 PROCEDURE — 99999 PR PBB SHADOW E&M-EST. PATIENT-LVL III: ICD-10-PCS | Mod: PBBFAC,,, | Performed by: OBSTETRICS & GYNECOLOGY

## 2023-09-29 PROCEDURE — 99213 OFFICE O/P EST LOW 20 MIN: CPT | Mod: PBBFAC,TH,PN | Performed by: OBSTETRICS & GYNECOLOGY

## 2023-09-29 PROCEDURE — 99213 OFFICE O/P EST LOW 20 MIN: CPT | Mod: TH,S$PBB,, | Performed by: OBSTETRICS & GYNECOLOGY

## 2023-09-29 NOTE — TELEPHONE ENCOUNTER
Rescheduled patient.      ----- Message from Ifrah Lowry MA sent at 9/29/2023 11:00 AM CDT -----  Pt called to r/s new consult appt on 10/19.      Pt contact:[phone]

## 2023-09-29 NOTE — PROGRESS NOTES
M-21 done no results in chart, staff retreiving   Doing ok recovering from shingles and now has a cold  Pre e labs  ASAqd  fht's 140-50's by u/s  MFM u/s ordered  rtc 4 wks after MFM u/s  Cont procardia  Patient with no complaints. Denies vaginal bleeding or cramping. Anatomy scan ordered. RTC in 4 weeks    Vitals signs, FHTs, urine dip, and PE findings documented, reviewed and available in OB flow chart.       I spent a total of 20 minutes on the day of the visit.This includes face to face time and non-face to face time preparing to see the patient (eg, review of tests), Obtaining and/or reviewing separately obtained history, Documenting clinical information in the electronic or other health record, Independently interpreting resultsand communicating results to the patient/family/caregiver, or Care coordination.

## 2023-10-02 ENCOUNTER — PATIENT MESSAGE (OUTPATIENT)
Dept: OTHER | Facility: OTHER | Age: 38
End: 2023-10-02
Payer: MEDICAID

## 2023-10-23 ENCOUNTER — PATIENT MESSAGE (OUTPATIENT)
Dept: OTHER | Facility: OTHER | Age: 38
End: 2023-10-23
Payer: MEDICAID

## 2023-10-26 ENCOUNTER — OFFICE VISIT (OUTPATIENT)
Dept: MATERNAL FETAL MEDICINE | Facility: CLINIC | Age: 38
End: 2023-10-26
Payer: MEDICAID

## 2023-10-26 ENCOUNTER — PROCEDURE VISIT (OUTPATIENT)
Dept: MATERNAL FETAL MEDICINE | Facility: CLINIC | Age: 38
End: 2023-10-26
Payer: MEDICAID

## 2023-10-26 ENCOUNTER — PATIENT MESSAGE (OUTPATIENT)
Dept: ADMINISTRATIVE | Facility: OTHER | Age: 38
End: 2023-10-26
Payer: MEDICAID

## 2023-10-26 VITALS
DIASTOLIC BLOOD PRESSURE: 86 MMHG | BODY MASS INDEX: 40.99 KG/M2 | WEIGHT: 255.06 LBS | SYSTOLIC BLOOD PRESSURE: 142 MMHG | HEIGHT: 66 IN

## 2023-10-26 DIAGNOSIS — Z3A.16 16 WEEKS GESTATION OF PREGNANCY: ICD-10-CM

## 2023-10-26 DIAGNOSIS — Z3A.20 20 WEEKS GESTATION OF PREGNANCY: ICD-10-CM

## 2023-10-26 DIAGNOSIS — Z36.89 ENCOUNTER FOR FETAL ANATOMIC SURVEY: ICD-10-CM

## 2023-10-26 DIAGNOSIS — O10.912 PRE-EXISTING HYPERTENSION DURING PREGNANCY IN SECOND TRIMESTER, UNSPECIFIED PRE-EXISTING HYPERTENSION TYPE: ICD-10-CM

## 2023-10-26 DIAGNOSIS — O36.8390 FETAL ARRHYTHMIA AFFECTING PREGNANCY, ANTEPARTUM: ICD-10-CM

## 2023-10-26 DIAGNOSIS — Z36.89 ENCOUNTER FOR ULTRASOUND TO CHECK FETAL GROWTH: Primary | ICD-10-CM

## 2023-10-26 PROCEDURE — 76811 OB US DETAILED SNGL FETUS: CPT | Mod: 26,S$PBB,, | Performed by: OBSTETRICS & GYNECOLOGY

## 2023-10-26 PROCEDURE — 99999 PR PBB SHADOW E&M-EST. PATIENT-LVL III: CPT | Mod: PBBFAC,,, | Performed by: OBSTETRICS & GYNECOLOGY

## 2023-10-26 PROCEDURE — 99213 OFFICE O/P EST LOW 20 MIN: CPT | Mod: PBBFAC,TH,PO,25 | Performed by: OBSTETRICS & GYNECOLOGY

## 2023-10-26 PROCEDURE — 99204 OFFICE O/P NEW MOD 45 MIN: CPT | Mod: 25,S$PBB,TH, | Performed by: OBSTETRICS & GYNECOLOGY

## 2023-10-26 PROCEDURE — 99204 PR OFFICE/OUTPT VISIT, NEW, LEVL IV, 45-59 MIN: ICD-10-PCS | Mod: 25,S$PBB,TH, | Performed by: OBSTETRICS & GYNECOLOGY

## 2023-10-26 PROCEDURE — 3008F BODY MASS INDEX DOCD: CPT | Mod: CPTII,,, | Performed by: OBSTETRICS & GYNECOLOGY

## 2023-10-26 PROCEDURE — 1159F MED LIST DOCD IN RCRD: CPT | Mod: CPTII,,, | Performed by: OBSTETRICS & GYNECOLOGY

## 2023-10-26 PROCEDURE — 3079F PR MOST RECENT DIASTOLIC BLOOD PRESSURE 80-89 MM HG: ICD-10-PCS | Mod: CPTII,,, | Performed by: OBSTETRICS & GYNECOLOGY

## 2023-10-26 PROCEDURE — 3008F PR BODY MASS INDEX (BMI) DOCUMENTED: ICD-10-PCS | Mod: CPTII,,, | Performed by: OBSTETRICS & GYNECOLOGY

## 2023-10-26 PROCEDURE — 76811 PR US, OB FETAL EVAL & EXAM, TRANSABDOM,FIRST GESTATION: ICD-10-PCS | Mod: 26,S$PBB,, | Performed by: OBSTETRICS & GYNECOLOGY

## 2023-10-26 PROCEDURE — 3077F PR MOST RECENT SYSTOLIC BLOOD PRESSURE >= 140 MM HG: ICD-10-PCS | Mod: CPTII,,, | Performed by: OBSTETRICS & GYNECOLOGY

## 2023-10-26 PROCEDURE — 99999 PR PBB SHADOW E&M-EST. PATIENT-LVL III: ICD-10-PCS | Mod: PBBFAC,,, | Performed by: OBSTETRICS & GYNECOLOGY

## 2023-10-26 PROCEDURE — 3077F SYST BP >= 140 MM HG: CPT | Mod: CPTII,,, | Performed by: OBSTETRICS & GYNECOLOGY

## 2023-10-26 PROCEDURE — 1159F PR MEDICATION LIST DOCUMENTED IN MEDICAL RECORD: ICD-10-PCS | Mod: CPTII,,, | Performed by: OBSTETRICS & GYNECOLOGY

## 2023-10-26 PROCEDURE — 76811 OB US DETAILED SNGL FETUS: CPT | Mod: PBBFAC,PO | Performed by: OBSTETRICS & GYNECOLOGY

## 2023-10-26 PROCEDURE — 3079F DIAST BP 80-89 MM HG: CPT | Mod: CPTII,,, | Performed by: OBSTETRICS & GYNECOLOGY

## 2023-10-26 RX ORDER — ASPIRIN 81 MG/1
81 TABLET ORAL DAILY
Status: ON HOLD | COMMUNITY
End: 2024-02-02 | Stop reason: HOSPADM

## 2023-10-27 ENCOUNTER — TELEPHONE (OUTPATIENT)
Dept: OBSTETRICS AND GYNECOLOGY | Facility: CLINIC | Age: 38
End: 2023-10-27
Payer: MEDICAID

## 2023-10-27 NOTE — TELEPHONE ENCOUNTER
"----- Message from Rigoberto Leslie sent at 10/27/2023 10:18 AM CDT -----  Pt Requesting to Schedule an Appointment     Pt is requesting to schedule an appointment that our scheduling dept cannot schedule.    Who called: pt  Best call back #: [phone]  When pt wants appt:  Reason for appt: monitor of fetal's heartbeat  Additional notes: pt said her "highrisk doctor" told her to see Dr. Johnson every week for 3 weeks for the reason listed above    "

## 2023-10-29 PROBLEM — O16.2 HYPERTENSION DURING PREGNANCY IN SECOND TRIMESTER: Status: ACTIVE | Noted: 2023-10-29

## 2023-10-29 PROBLEM — O36.8390 FETAL ARRHYTHMIA AFFECTING PREGNANCY, ANTEPARTUM: Status: ACTIVE | Noted: 2023-10-29

## 2023-10-29 NOTE — ASSESSMENT & PLAN NOTE
CHTN:  Meds: Procardia XL 30 mg daily, ASA 81 mg daily   Connected Mom     Counseling:  Chronic Hypertension  Today I counseled the patient on maternal/fetal risks associated with CHTN during pregnancy. Risks include but not limited to fetal growth restriction, miscarriage, abruption, maternal end organ disease (renal failure, MI, and stroke),  delivery, development of superimposed preeclampsia, and eclampsia. She was counseled on the recommendations for blood pressure control, serial ultrasound for fetal growth assessment and  testing, and timing of delivery. I also counseled her on the recommendation for aspirin 81 mg daily which may decrease her risk of developing superimposed preeclampsia: Patient already taking ASA 81 mg daily.      Recommendations for Primary OB management:   Continue aspirin 81 mg daily for preeclampsia risk reduction   Continue current medications: Procardia 30 mg XL daily    Baseline evaluation with primary OB:   o 24-hour urine protein or baseline P/C ratio, CMP, and CBC: already performed.  o Maternal EKG  o Maternal ophthalmic evaluation  o Maternal echocardiogram if HTN has been long-standing or EKG is abnormal   Serial fetal growth ultrasounds every 4-6 weeks, beginning at 26-28 weeks.  - Ultrasound and RT MFM MD visit scheduled for 29    Continued close observation of patient's blood pressures. Avoid hypotension as this has been associated with uteroplacental insufficiency.  o Recommend treatment to a goal blood pressure < 140/90   Weekly antepartum testing at 32 weeks (NST+AFV); twice weekly testing if control is poor, multiple comorbidities are present, or requires several medications for control      Delivery timing:  No medications, no comorbid conditions: 39 0/7 - 39 6/7 weeks gestation  No medications, comorbid conditions: 38 0/7 - 38 6/7 weeks gestation  Controlled on single agent, no comorbid conditions*: 38 0/7 - 38 6/7 weeks gestation  Controlled  on single agent, comorbid conditions*: 37 0/7 - 38 6/7 weeks gestation  Uncontrolled or requiring ? 2 medications: 36 0/7 - 37 6/7 weeks gestation    *Comorbid conditions include BMI >= 40, diabetes, and complex medical condition associated with placental dysfunction (ie lupus or other vascular disease)  Delivery may be recommended earlier pending results of fetal growth ultrasounds, AFV assessment, or antepartum testing results.

## 2023-10-29 NOTE — PROGRESS NOTES
"  MATERNAL-FETAL MEDICINE   CONSULT NOTE    Provider requesting consultation: Dr. Izquierdo    SUBJECTIVE:     Ms. Joseph Duenas is a 38 y.o.  female with IUP at 20w3d who is seen in consultation by MFM for evaluation and management of:    Patient accompanied by her son (Adam)     Epic and Viewpoint notes/chart reviewed     Medication List with Changes/Refills   Current Medications    ACYCLOVIR (ZOVIRAX) 800 MG TAB    Take 1 tablet (800 mg total) by mouth 5 (five) times daily.    ASPIRIN (ECOTRIN) 81 MG EC TABLET    Take 81 mg by mouth once daily.    ERYTHROMYCIN (ROMYCIN) OPHTHALMIC OINTMENT    Place a 1/2 inch ribbon of ointment into the lower eyelid 4 times daily for one week.    FLUTICASONE (FLONASE) 50 MCG/ACTUATION NASAL SPRAY    2 sprays (100 mcg total) by Each Nare route 2 (two) times daily as needed.    NIFEDIPINE (PROCARDIA XL) 30 MG (OSM) 24 HR TABLET    Take 30 mg by mouth once daily.    NIFEDIPINE (PROCARDIA-XL) 30 MG (OSM) 24 HR TABLET    Take 1 tablet (30 mg total) by mouth once daily.    NORGESTIMATE-ETHINYL ESTRADIOL (ORTHO TRI-CYCLEN LO) 0.18/0.215/0.25 MG-25 MCG TABLET    Take 1 tablet by mouth once daily.       Review of patient's allergies indicates:  No Known Allergies    PMH:No past medical history on file.    PObHx:  OB History    Para Term  AB Living   5 2 2   2 1   SAB IAB Ectopic Multiple Live Births   1     0 1      # Outcome Date GA Lbr Paul/2nd Weight Sex Delivery Anes PTL Lv   5 Current            4 Term 17 38w6d  3.816 kg (8 lb 6.6 oz) M CS-LTranv EPI N    3 AB 2015           2 SAB  12w0d          1 Term 07 39w0d  3.884 kg (8 lb 9 oz) M CS-LTranv  N RODOLFO      Birth Comments: per pt "toxemia at end of pregnancy"      Complications: Pre-eclampsia       PSH:  Past Surgical History:   Procedure Laterality Date     SECTION         Family history:family history includes Hypertension in her father.    Social history: reports that she has never " "smoked. She has never used smokeless tobacco. She reports that she does not currently use alcohol. She reports that she does not use drugs.      Objective:   BP (!) 142/86   Ht 5' 6" (1.676 m)   Wt 115.7 kg (255 lb 1.2 oz)   LMP 2023   BMI 41.17 kg/m²     Ultrasound performed. See viewpoint for full ultrasound report.  A detailed fetal anatomic ultrasound examination was performed for the following high risk indication: AMA  No fetal structural malformations are identified; however, fetal imaging is incomplete today.   Premature atrial contractions (PAC's) visualized   Fetal size today is consistent with established gestational age.   Cervical length by TA scanning is normal.   Placental location is anterior without evidence of previa per transabdominal ultrasound     Significant labs/imagin23:  Plts 192k   23:   P/Cr 0.10    Cr 0.44   AST/ALT      ASSESSMENT/PLAN:     38 y.o.  female with IUP at 20w63     Hypertension during pregnancy in second trimester  CHTN:  Meds: Procardia XL 30 mg daily, ASA 81 mg daily   Connected Mom     Counseling:  Chronic Hypertension  Today I counseled the patient on maternal/fetal risks associated with CHTN during pregnancy. Risks include but not limited to fetal growth restriction, miscarriage, abruption, maternal end organ disease (renal failure, MI, and stroke),  delivery, development of superimposed preeclampsia, and eclampsia. She was counseled on the recommendations for blood pressure control, serial ultrasound for fetal growth assessment and  testing, and timing of delivery. I also counseled her on the recommendation for aspirin 81 mg daily which may decrease her risk of developing superimposed preeclampsia: Patient already taking ASA 81 mg daily.      Recommendations for Primary OB management:  Continue aspirin 81 mg daily for preeclampsia risk reduction  Continue current medications: Procardia 30 mg XL daily   Baseline " evaluation with primary OB:   24-hour urine protein or baseline P/C ratio, CMP, and CBC: already performed.  Maternal EKG  Maternal ophthalmic evaluation  Maternal echocardiogram if HTN has been long-standing or EKG is abnormal  Serial fetal growth ultrasounds every 4-6 weeks, beginning at 26-28 weeks.  Ultrasound and RT MFM MD visit scheduled for 11-16-29   Continued close observation of patient's blood pressures. Avoid hypotension as this has been associated with uteroplacental insufficiency.  Recommend treatment to a goal blood pressure < 140/90  Weekly antepartum testing at 32 weeks (NST+AFV); twice weekly testing if control is poor, multiple comorbidities are present, or requires several medications for control     Delivery timing:  No medications, no comorbid conditions: 39 0/7 - 39 6/7 weeks gestation  No medications, comorbid conditions: 38 0/7 - 38 6/7 weeks gestation  Controlled on single agent, no comorbid conditions*: 38 0/7 - 38 6/7 weeks gestation  Controlled on single agent, comorbid conditions*: 37 0/7 - 38 6/7 weeks gestation  Uncontrolled or requiring ? 2 medications: 36 0/7 - 37 6/7 weeks gestation    *Comorbid conditions include BMI >= 40, diabetes, and complex medical condition associated with placental dysfunction (ie lupus or other vascular disease)  Delivery may be recommended earlier pending results of fetal growth ultrasounds, AFV assessment, or antepartum testing results.    ___________________________________________________________________________________    Fetal arrhythmia affecting pregnancy, antepartum  Fetal PAC's     Patient counseled on today's ultrasound evaluation including but not limited to no fetal structural abnormalities appreciated but incomplete fetal anatomical survey. Patient counseled on presence of premature atrial contractions (PAC's) which are perceived as usually benign but can progress to a more concerning fetal arrhythmia.    Patient counseled on caffeine intake  and she reports she drinks some coffee but also tea. She was counseled on cessation of caffeine intake including chocolate, caffeinated drinks.     Patient counseled on f/u FHR with doppler in her Primary OB office weekly for the next 2 weeks. She was counseled on f/u ultrasound and RT MFM MD visit in 3 wks.     Patient's questions were answered.     _______________________________________________________________________________      FOLLOW UP:   -recommend avoidance of caffeine intake     -recommend weekly fetal heart rate documentation per doppler with Primary OB until f/u ultrasound with MFM in 3 wks          Primary OB to arrange for weekly FHR documentation: Please notify MFM if fetal heart rate          at least 180 bpm or greater     -ultrasound and RT MFM MD visit scheduled for 11-16-23 and earlier as clinically indicated     About 40 - 45 minutes of total time spent on the encounter, which includes face to face time and non-face to face time preparing to see the patient (eg, review of tests), obtaining and/or reviewing separately obtained history, documenting clinical information in the electronic or other health record, independently interpreting results (not separately reported) and communicating results to the patient/family/caregiver, or care coordination (not separately reported).      Yoli Sánchez  Maternal-Fetal Medicine    Electronically Signed by Yoli Sánchez October 29, 2023

## 2023-10-29 NOTE — ASSESSMENT & PLAN NOTE
Fetal PAC's     Patient counseled on today's ultrasound evaluation including but not limited to no fetal structural abnormalities appreciated but incomplete fetal anatomical survey. Patient counseled on presence of premature atrial contractions (PAC's) which are perceived as usually benign but can progress to a more concerning fetal arrhythmia.    Patient counseled on caffeine intake and she reports she drinks some coffee but also tea. She was counseled on cessation of caffeine intake including chocolate, caffeinated drinks.     Patient counseled on f/u FHR with doppler in her Primary OB office weekly for the next 2 weeks. She was counseled on f/u ultrasound and RT MFM MD visit in 3 wks.     Patient's questions were answered.

## 2023-11-16 ENCOUNTER — OFFICE VISIT (OUTPATIENT)
Dept: MATERNAL FETAL MEDICINE | Facility: CLINIC | Age: 38
End: 2023-11-16
Payer: MEDICAID

## 2023-11-16 ENCOUNTER — PROCEDURE VISIT (OUTPATIENT)
Dept: MATERNAL FETAL MEDICINE | Facility: CLINIC | Age: 38
End: 2023-11-16
Payer: MEDICAID

## 2023-11-16 VITALS
BODY MASS INDEX: 42.69 KG/M2 | WEIGHT: 265.63 LBS | DIASTOLIC BLOOD PRESSURE: 94 MMHG | SYSTOLIC BLOOD PRESSURE: 132 MMHG | HEIGHT: 66 IN

## 2023-11-16 DIAGNOSIS — O09.522 MULTIGRAVIDA OF ADVANCED MATERNAL AGE IN SECOND TRIMESTER: ICD-10-CM

## 2023-11-16 DIAGNOSIS — Z36.89 ENCOUNTER FOR ULTRASOUND TO CHECK FETAL GROWTH: ICD-10-CM

## 2023-11-16 DIAGNOSIS — Z36.2 ENCOUNTER FOR FOLLOW-UP ULTRASOUND OF FETAL ANATOMY: ICD-10-CM

## 2023-11-16 DIAGNOSIS — O99.210 OBESITY IN PREGNANCY: ICD-10-CM

## 2023-11-16 DIAGNOSIS — Z36.4 ULTRASOUND FOR ANTENATAL SCREENING FOR FETAL GROWTH RESTRICTION: ICD-10-CM

## 2023-11-16 DIAGNOSIS — O10.912 PRE-EXISTING HYPERTENSION DURING PREGNANCY IN SECOND TRIMESTER, UNSPECIFIED PRE-EXISTING HYPERTENSION TYPE: Primary | ICD-10-CM

## 2023-11-16 PROCEDURE — 3008F PR BODY MASS INDEX (BMI) DOCUMENTED: ICD-10-PCS | Mod: CPTII,,, | Performed by: OBSTETRICS & GYNECOLOGY

## 2023-11-16 PROCEDURE — 3008F BODY MASS INDEX DOCD: CPT | Mod: CPTII,,, | Performed by: OBSTETRICS & GYNECOLOGY

## 2023-11-16 PROCEDURE — 76816 PR  US,PREGNANT UTERUS,F/U,TRANSABD APP: ICD-10-PCS | Mod: 26,S$PBB,, | Performed by: OBSTETRICS & GYNECOLOGY

## 2023-11-16 PROCEDURE — 3075F SYST BP GE 130 - 139MM HG: CPT | Mod: CPTII,,, | Performed by: OBSTETRICS & GYNECOLOGY

## 2023-11-16 PROCEDURE — 3080F DIAST BP >= 90 MM HG: CPT | Mod: CPTII,,, | Performed by: OBSTETRICS & GYNECOLOGY

## 2023-11-16 PROCEDURE — 99999 PR PBB SHADOW E&M-EST. PATIENT-LVL III: ICD-10-PCS | Mod: PBBFAC,,, | Performed by: OBSTETRICS & GYNECOLOGY

## 2023-11-16 PROCEDURE — 3080F PR MOST RECENT DIASTOLIC BLOOD PRESSURE >= 90 MM HG: ICD-10-PCS | Mod: CPTII,,, | Performed by: OBSTETRICS & GYNECOLOGY

## 2023-11-16 PROCEDURE — 76816 OB US FOLLOW-UP PER FETUS: CPT | Mod: PBBFAC,PO | Performed by: OBSTETRICS & GYNECOLOGY

## 2023-11-16 PROCEDURE — 99213 OFFICE O/P EST LOW 20 MIN: CPT | Mod: S$PBB,TH,, | Performed by: OBSTETRICS & GYNECOLOGY

## 2023-11-16 PROCEDURE — 99213 PR OFFICE/OUTPT VISIT, EST, LEVL III, 20-29 MIN: ICD-10-PCS | Mod: S$PBB,TH,, | Performed by: OBSTETRICS & GYNECOLOGY

## 2023-11-16 PROCEDURE — 3075F PR MOST RECENT SYSTOLIC BLOOD PRESS GE 130-139MM HG: ICD-10-PCS | Mod: CPTII,,, | Performed by: OBSTETRICS & GYNECOLOGY

## 2023-11-16 PROCEDURE — 76816 OB US FOLLOW-UP PER FETUS: CPT | Mod: 26,S$PBB,, | Performed by: OBSTETRICS & GYNECOLOGY

## 2023-11-16 PROCEDURE — 1159F PR MEDICATION LIST DOCUMENTED IN MEDICAL RECORD: ICD-10-PCS | Mod: CPTII,,, | Performed by: OBSTETRICS & GYNECOLOGY

## 2023-11-16 PROCEDURE — 1159F MED LIST DOCD IN RCRD: CPT | Mod: CPTII,,, | Performed by: OBSTETRICS & GYNECOLOGY

## 2023-11-16 PROCEDURE — 99213 OFFICE O/P EST LOW 20 MIN: CPT | Mod: PBBFAC,TH,PO | Performed by: OBSTETRICS & GYNECOLOGY

## 2023-11-16 PROCEDURE — 99999 PR PBB SHADOW E&M-EST. PATIENT-LVL III: CPT | Mod: PBBFAC,,, | Performed by: OBSTETRICS & GYNECOLOGY

## 2023-11-17 ENCOUNTER — ROUTINE PRENATAL (OUTPATIENT)
Dept: OBSTETRICS AND GYNECOLOGY | Facility: CLINIC | Age: 38
End: 2023-11-17
Payer: MEDICAID

## 2023-11-17 VITALS
BODY MASS INDEX: 43.16 KG/M2 | SYSTOLIC BLOOD PRESSURE: 122 MMHG | DIASTOLIC BLOOD PRESSURE: 76 MMHG | WEIGHT: 267.44 LBS

## 2023-11-17 DIAGNOSIS — Z3A.23 23 WEEKS GESTATION OF PREGNANCY: Primary | ICD-10-CM

## 2023-11-17 PROCEDURE — 99213 OFFICE O/P EST LOW 20 MIN: CPT | Mod: TH,S$PBB,, | Performed by: OBSTETRICS & GYNECOLOGY

## 2023-11-17 PROCEDURE — 99213 OFFICE O/P EST LOW 20 MIN: CPT | Mod: PBBFAC,PO | Performed by: OBSTETRICS & GYNECOLOGY

## 2023-11-17 PROCEDURE — 99213 PR OFFICE/OUTPT VISIT, EST, LEVL III, 20-29 MIN: ICD-10-PCS | Mod: TH,S$PBB,, | Performed by: OBSTETRICS & GYNECOLOGY

## 2023-11-17 PROCEDURE — 99999 PR PBB SHADOW E&M-EST. PATIENT-LVL III: ICD-10-PCS | Mod: PBBFAC,,, | Performed by: OBSTETRICS & GYNECOLOGY

## 2023-11-17 PROCEDURE — 99999 PR PBB SHADOW E&M-EST. PATIENT-LVL III: CPT | Mod: PBBFAC,,, | Performed by: OBSTETRICS & GYNECOLOGY

## 2023-11-17 NOTE — PROGRESS NOTES
FHTs 140s-150s  Pt saw MFM yesterday, looked good, no arrhythmia   BP was 146/92 today in clinic, on repeat BP was 122/76  Pt reports compliance with Procardia and ASA and checking BP at home daily   Consented today for C/S and BTL  Patient with no complaints. Denies vaginal bleeding or cramping.  Good FM. Discussed with patient having glucose testing for gestational diabetes preformed between 24-28 weeks. OB glucose screen ordered. RTC in 4 weeks.     Vitals signs, FHTs, urine dip, and PE findings documented, reviewed and available in OB flow chart.       I spent a total of 20 minutes on the day of the visit.This includes face to face time and non-face to face time preparing to see the patient (eg, review of tests), Obtaining and/or reviewing separately obtained history, Documenting clinical information in the electronic or other health record, Independently interpreting resultsand communicating results to the patient/family/caregiver, or Care coordination.       Brittney Beltran MD, MPH  [facility] PGY-2  11/17/2023

## 2023-12-04 ENCOUNTER — PATIENT MESSAGE (OUTPATIENT)
Dept: OTHER | Facility: OTHER | Age: 38
End: 2023-12-04
Payer: MEDICAID

## 2023-12-08 ENCOUNTER — LAB VISIT (OUTPATIENT)
Dept: LAB | Facility: HOSPITAL | Age: 38
End: 2023-12-08
Attending: OBSTETRICS & GYNECOLOGY
Payer: MEDICAID

## 2023-12-08 DIAGNOSIS — Z3A.23 23 WEEKS GESTATION OF PREGNANCY: ICD-10-CM

## 2023-12-08 LAB — GLUCOSE SERPL-MCNC: 105 MG/DL (ref 70–140)

## 2023-12-08 PROCEDURE — 82950 GLUCOSE TEST: CPT | Mod: PN

## 2023-12-08 PROCEDURE — 36415 COLL VENOUS BLD VENIPUNCTURE: CPT | Mod: PN

## 2023-12-18 ENCOUNTER — PATIENT MESSAGE (OUTPATIENT)
Dept: OTHER | Facility: OTHER | Age: 38
End: 2023-12-18
Payer: MEDICAID

## 2023-12-18 ENCOUNTER — ROUTINE PRENATAL (OUTPATIENT)
Dept: OBSTETRICS AND GYNECOLOGY | Facility: CLINIC | Age: 38
End: 2023-12-18
Payer: MEDICAID

## 2023-12-18 VITALS
SYSTOLIC BLOOD PRESSURE: 156 MMHG | BODY MASS INDEX: 44.27 KG/M2 | WEIGHT: 274.25 LBS | DIASTOLIC BLOOD PRESSURE: 98 MMHG | HEART RATE: 98 BPM

## 2023-12-18 DIAGNOSIS — O16.3 HYPERTENSION AFFECTING PREGNANCY IN THIRD TRIMESTER: Primary | ICD-10-CM

## 2023-12-18 LAB
CREAT UR-MCNC: 63 MG/DL (ref 15–325)
PROT UR-MCNC: 21 MG/DL (ref 0–15)
PROT/CREAT UR: 0.33 MG/G{CREAT} (ref 0–0.2)

## 2023-12-18 PROCEDURE — 99213 PR OFFICE/OUTPT VISIT, EST, LEVL III, 20-29 MIN: ICD-10-PCS | Mod: TH,S$PBB,, | Performed by: OBSTETRICS & GYNECOLOGY

## 2023-12-18 PROCEDURE — 82570 ASSAY OF URINE CREATININE: CPT | Performed by: OBSTETRICS & GYNECOLOGY

## 2023-12-18 PROCEDURE — 99999 PR PBB SHADOW E&M-EST. PATIENT-LVL I: CPT | Mod: PBBFAC,,, | Performed by: OBSTETRICS & GYNECOLOGY

## 2023-12-18 PROCEDURE — 99999 PR PBB SHADOW E&M-EST. PATIENT-LVL I: ICD-10-PCS | Mod: PBBFAC,,, | Performed by: OBSTETRICS & GYNECOLOGY

## 2023-12-18 PROCEDURE — 99213 OFFICE O/P EST LOW 20 MIN: CPT | Mod: TH,S$PBB,, | Performed by: OBSTETRICS & GYNECOLOGY

## 2023-12-18 PROCEDURE — 99211 OFF/OP EST MAY X REQ PHY/QHP: CPT | Mod: PBBFAC,TH,PO | Performed by: OBSTETRICS & GYNECOLOGY

## 2023-12-18 NOTE — PROGRESS NOTES
Stressed this am, dad admitted to hosp[ital for dehydration, has been up all night with him  Nop Pr e e s/x's  Will cont procardia and rpt BP and NST this fri  Pre e precautions given  Fht's130-40's    Gluc screen nml    Patient with no complaints. Denies vaginal bleeding or contractions. Good FM. Growth scan ordered for next visit.     Vitals signs, FHTs, urine dip, and PE findings documented, reviewed and available in OB flow chart.       I spent a total of 20 minutes on the day of the visit.This includes face to face time and non-face to face time preparing to see the patient (eg, review of tests), Obtaining and/or reviewing separately obtained history, Documenting clinical information in the electronic or other health record, Independently interpreting resultsand communicating results to the patient/family/caregiver, or Care coordination.     [employer] Motivation Document discussed with mother. Reinforced benefits of breastfeeding, risk of formula, and cue based feedings. Encouraged mother to download [employer] mobile michael if she has not already done so. Mother verbalizes understanding.

## 2023-12-19 ENCOUNTER — TELEPHONE (OUTPATIENT)
Dept: OBSTETRICS AND GYNECOLOGY | Facility: CLINIC | Age: 38
End: 2023-12-19
Payer: MEDICAID

## 2023-12-19 DIAGNOSIS — O11.9 CHRONIC HYPERTENSION WITH SUPERIMPOSED PRE-ECLAMPSIA: Primary | ICD-10-CM

## 2023-12-20 ENCOUNTER — LAB VISIT (OUTPATIENT)
Dept: LAB | Facility: HOSPITAL | Age: 38
End: 2023-12-20
Attending: OBSTETRICS & GYNECOLOGY
Payer: MEDICAID

## 2023-12-20 DIAGNOSIS — O16.3 HYPERTENSION AFFECTING PREGNANCY IN THIRD TRIMESTER: ICD-10-CM

## 2023-12-20 LAB
ALBUMIN SERPL BCP-MCNC: 3.5 G/DL (ref 3.5–5.2)
ALP SERPL-CCNC: 85 U/L (ref 38–126)
ALT SERPL W/O P-5'-P-CCNC: 17 U/L (ref 10–44)
ANION GAP SERPL CALC-SCNC: 10 MMOL/L (ref 8–16)
AST SERPL-CCNC: 19 U/L (ref 15–46)
BASOPHILS # BLD AUTO: 0.03 K/UL (ref 0–0.2)
BASOPHILS NFR BLD: 0.3 % (ref 0–1.9)
BILIRUB SERPL-MCNC: 0.4 MG/DL (ref 0.1–1)
CALCIUM SERPL-MCNC: 8.3 MG/DL (ref 8.7–10.5)
CHLORIDE SERPL-SCNC: 106 MMOL/L (ref 95–110)
CO2 SERPL-SCNC: 21 MMOL/L (ref 23–29)
CREAT SERPL-MCNC: 0.56 MG/DL (ref 0.5–1.4)
DIFFERENTIAL METHOD: ABNORMAL
EOSINOPHIL # BLD AUTO: 0.2 K/UL (ref 0–0.5)
EOSINOPHIL NFR BLD: 2.8 % (ref 0–8)
ERYTHROCYTE [DISTWIDTH] IN BLOOD BY AUTOMATED COUNT: 13.1 % (ref 11.5–14.5)
EST. GFR  (NO RACE VARIABLE): >60 ML/MIN/1.73 M^2
GLUCOSE SERPL-MCNC: 101 MG/DL (ref 70–110)
HCT VFR BLD AUTO: 34.8 % (ref 37–48.5)
HGB BLD-MCNC: 11.3 G/DL (ref 12–16)
IMM GRANULOCYTES # BLD AUTO: 0.03 K/UL (ref 0–0.04)
IMM GRANULOCYTES NFR BLD AUTO: 0.3 % (ref 0–0.5)
LYMPHOCYTES # BLD AUTO: 1.3 K/UL (ref 1–4.8)
LYMPHOCYTES NFR BLD: 15.5 % (ref 18–48)
MCH RBC QN AUTO: 29.4 PG (ref 27–31)
MCHC RBC AUTO-ENTMCNC: 32.5 G/DL (ref 32–36)
MCV RBC AUTO: 91 FL (ref 82–98)
MONOCYTES # BLD AUTO: 0.5 K/UL (ref 0.3–1)
MONOCYTES NFR BLD: 5.4 % (ref 4–15)
NEUTROPHILS # BLD AUTO: 6.5 K/UL (ref 1.8–7.7)
NEUTROPHILS NFR BLD: 75.7 % (ref 38–73)
NRBC BLD-RTO: 0 /100 WBC
PLATELET # BLD AUTO: 224 K/UL (ref 150–450)
PMV BLD AUTO: 12 FL (ref 9.2–12.9)
POTASSIUM SERPL-SCNC: 3.7 MMOL/L (ref 3.5–5.1)
PROT SERPL-MCNC: 7.4 G/DL (ref 6–8.4)
RBC # BLD AUTO: 3.84 M/UL (ref 4–5.4)
SODIUM SERPL-SCNC: 137 MMOL/L (ref 136–145)
UUN UR-MCNC: 9 MG/DL (ref 7–17)
WBC # BLD AUTO: 8.63 K/UL (ref 3.9–12.7)

## 2023-12-20 PROCEDURE — 36415 COLL VENOUS BLD VENIPUNCTURE: CPT | Mod: PN | Performed by: OBSTETRICS & GYNECOLOGY

## 2023-12-20 PROCEDURE — 80053 COMPREHEN METABOLIC PANEL: CPT | Mod: PN | Performed by: OBSTETRICS & GYNECOLOGY

## 2023-12-20 PROCEDURE — 85025 COMPLETE CBC W/AUTO DIFF WBC: CPT | Mod: PN | Performed by: OBSTETRICS & GYNECOLOGY

## 2023-12-20 NOTE — TELEPHONE ENCOUNTER
Spoke with patient she is aware we are working to make her U/S for Friday 01/22 and for her to go to lab twice a week.

## 2023-12-22 ENCOUNTER — PROCEDURE VISIT (OUTPATIENT)
Dept: MATERNAL FETAL MEDICINE | Facility: CLINIC | Age: 38
End: 2023-12-22
Payer: MEDICAID

## 2023-12-22 DIAGNOSIS — O16.3 HYPERTENSION AFFECTING PREGNANCY IN THIRD TRIMESTER: ICD-10-CM

## 2023-12-22 PROCEDURE — 76816 US OB/GYN PROCEDURE (VIEWPOINT): ICD-10-PCS | Mod: 26,S$PBB,, | Performed by: OBSTETRICS & GYNECOLOGY

## 2023-12-22 PROCEDURE — 76816 OB US FOLLOW-UP PER FETUS: CPT | Mod: PBBFAC,PO | Performed by: OBSTETRICS & GYNECOLOGY

## 2024-01-01 ENCOUNTER — PATIENT MESSAGE (OUTPATIENT)
Dept: OTHER | Facility: OTHER | Age: 39
End: 2024-01-01
Payer: MEDICAID

## 2024-01-05 ENCOUNTER — ROUTINE PRENATAL (OUTPATIENT)
Dept: OBSTETRICS AND GYNECOLOGY | Facility: CLINIC | Age: 39
End: 2024-01-05
Payer: MEDICAID

## 2024-01-05 VITALS
SYSTOLIC BLOOD PRESSURE: 146 MMHG | WEIGHT: 281.06 LBS | DIASTOLIC BLOOD PRESSURE: 80 MMHG | BODY MASS INDEX: 45.37 KG/M2

## 2024-01-05 DIAGNOSIS — O11.9 CHRONIC HYPERTENSION WITH SUPERIMPOSED PRE-ECLAMPSIA: ICD-10-CM

## 2024-01-05 DIAGNOSIS — Z3A.30 30 WEEKS GESTATION OF PREGNANCY: Primary | ICD-10-CM

## 2024-01-05 LAB
CREAT UR-MCNC: 60 MG/DL (ref 15–325)
PROT UR-MCNC: 19 MG/DL (ref 0–15)
PROT/CREAT UR: 0.32 MG/G{CREAT} (ref 0–0.2)

## 2024-01-05 PROCEDURE — 99212 OFFICE O/P EST SF 10 MIN: CPT | Mod: PBBFAC,TH,PO | Performed by: OBSTETRICS & GYNECOLOGY

## 2024-01-05 PROCEDURE — 99213 OFFICE O/P EST LOW 20 MIN: CPT | Mod: TH,S$PBB,, | Performed by: OBSTETRICS & GYNECOLOGY

## 2024-01-05 PROCEDURE — 99999 PR PBB SHADOW E&M-EST. PATIENT-LVL II: CPT | Mod: PBBFAC,,, | Performed by: OBSTETRICS & GYNECOLOGY

## 2024-01-05 PROCEDURE — 84156 ASSAY OF PROTEIN URINE: CPT | Performed by: STUDENT IN AN ORGANIZED HEALTH CARE EDUCATION/TRAINING PROGRAM

## 2024-01-05 NOTE — PROGRESS NOTES
Patient with no complaints. Taking the nifedipine 30 mg daily, home and office readings consistently 140's/80's. Denies vaginal bleeding or contractions. Good FM. Plan for weekly NST starting in 2 weeks.    Vitals signs, FHTs 140, urine dip, and PE findings documented, reviewed and available in OB flow chart.       I spent a total of 20 minutes on the day of the visit.This includes face to face time and non-face to face time preparing to see the patient (eg, review of tests), Obtaining and/or reviewing separately obtained history, Documenting clinical information in the electronic or other health record, Independently interpreting resultsand communicating results to the patient/family/caregiver, or Care coordination.       Pedro Augustin MD  [facility] PGY-3

## 2024-01-08 ENCOUNTER — TELEPHONE (OUTPATIENT)
Dept: OBSTETRICS AND GYNECOLOGY | Facility: CLINIC | Age: 39
End: 2024-01-08
Payer: MEDICAID

## 2024-01-08 DIAGNOSIS — O12.13 PROTEINURIA AFFECTING PREGNANCY IN THIRD TRIMESTER: ICD-10-CM

## 2024-01-08 DIAGNOSIS — O16.3 HYPERTENSION AFFECTING PREGNANCY IN THIRD TRIMESTER: Primary | ICD-10-CM

## 2024-01-11 ENCOUNTER — PROCEDURE VISIT (OUTPATIENT)
Dept: MATERNAL FETAL MEDICINE | Facility: CLINIC | Age: 39
End: 2024-01-11
Payer: MEDICAID

## 2024-01-11 ENCOUNTER — OFFICE VISIT (OUTPATIENT)
Dept: MATERNAL FETAL MEDICINE | Facility: CLINIC | Age: 39
End: 2024-01-11
Payer: MEDICAID

## 2024-01-11 VITALS
BODY MASS INDEX: 44.61 KG/M2 | DIASTOLIC BLOOD PRESSURE: 90 MMHG | WEIGHT: 277.56 LBS | HEIGHT: 66 IN | SYSTOLIC BLOOD PRESSURE: 130 MMHG

## 2024-01-11 DIAGNOSIS — O10.912 PRE-EXISTING HYPERTENSION DURING PREGNANCY IN SECOND TRIMESTER, UNSPECIFIED PRE-EXISTING HYPERTENSION TYPE: Primary | ICD-10-CM

## 2024-01-11 DIAGNOSIS — O12.13 PROTEINURIA AFFECTING PREGNANCY IN THIRD TRIMESTER: ICD-10-CM

## 2024-01-11 DIAGNOSIS — O16.3 HYPERTENSION AFFECTING PREGNANCY IN THIRD TRIMESTER: ICD-10-CM

## 2024-01-11 DIAGNOSIS — Z3A.37 37 WEEKS GESTATION OF PREGNANCY: ICD-10-CM

## 2024-01-11 PROCEDURE — 76819 FETAL BIOPHYS PROFIL W/O NST: CPT | Mod: 26,S$PBB,, | Performed by: OBSTETRICS & GYNECOLOGY

## 2024-01-11 PROCEDURE — 99214 OFFICE O/P EST MOD 30 MIN: CPT | Mod: S$PBB,TH,, | Performed by: OBSTETRICS & GYNECOLOGY

## 2024-01-11 PROCEDURE — 3075F SYST BP GE 130 - 139MM HG: CPT | Mod: CPTII,,, | Performed by: OBSTETRICS & GYNECOLOGY

## 2024-01-11 PROCEDURE — 76816 OB US FOLLOW-UP PER FETUS: CPT | Mod: PBBFAC,PO | Performed by: OBSTETRICS & GYNECOLOGY

## 2024-01-11 PROCEDURE — 99999 PR PBB SHADOW E&M-EST. PATIENT-LVL III: CPT | Mod: PBBFAC,,, | Performed by: OBSTETRICS & GYNECOLOGY

## 2024-01-11 PROCEDURE — 1159F MED LIST DOCD IN RCRD: CPT | Mod: CPTII,,, | Performed by: OBSTETRICS & GYNECOLOGY

## 2024-01-11 PROCEDURE — 3080F DIAST BP >= 90 MM HG: CPT | Mod: CPTII,,, | Performed by: OBSTETRICS & GYNECOLOGY

## 2024-01-11 PROCEDURE — 3008F BODY MASS INDEX DOCD: CPT | Mod: CPTII,,, | Performed by: OBSTETRICS & GYNECOLOGY

## 2024-01-11 PROCEDURE — 99213 OFFICE O/P EST LOW 20 MIN: CPT | Mod: PBBFAC,TH,PO | Performed by: OBSTETRICS & GYNECOLOGY

## 2024-01-11 NOTE — PROGRESS NOTES
"Maternal Fetal Medicine follow up consult    SUBJECTIVE:     Joseph Duenas is a 38 y.o.  female with IUP at 31w3d who is seen in follow up consultation by MFM.  Pregnancy complications include:   Problem   Hypertension During Pregnancy in Second Trimester       Previous notes reviewed.   No changes to medical, surgical, family, social, or obstetric history.    Interval history since last MFM visit: +FM. No concerns    Medications:  Current Outpatient Medications   Medication Instructions    acyclovir (ZOVIRAX) 800 mg, Oral, 5 times daily    aspirin (ECOTRIN) 81 mg, Oral, Daily    fluticasone propionate (FLONASE) 100 mcg, Each Nostril, 2 times daily PRN    NIFEdipine (PROCARDIA XL) 30 mg, Oral, Daily    NIFEdipine (PROCARDIA-XL) 30 mg, Oral, Daily       Care team members:  Dr. Izquierdo - Primary OB     OBJECTIVE:   BP (!) 130/90   Ht 5' 6" (1.676 m)   Wt 125.9 kg (277 lb 9 oz)   LMP 2023   BMI 44.80 kg/m²     Physical Exam    Ultrasound performed. See viewpoint for full ultrasound report.    Significant labs/imaging:  Reviewed in [facility]    ASSESSMENT/PLAN:     38 y.o.  female with IUP at 31w3d    Hypertension during pregnancy in second trimester  Prior consultation reviewed.  Prenatal labs and vitals reviewed.  Asymptomatic.  +FM.  Recent labs reviewed-normal CMP/CBC. P/C now 0.32.    Discussed can be normal to have increase in proteinuria in third trimester, but could also represent early signs of superimposed preeclampsia. In absence of severe features of preeclampsia, recommend delivery at 37 weeks. Risks of preeclampsia reviewed and strict precautions discussed.  We discussed monitoring strategy as an outpatient, assuming this is related to superimposed preeclampsia.  Recommend:  Fetal movement monitoring at home twice daily; to L&D with less than 10 movements in 2 hours  Preeclampsia symptom monitoring at home; to L&D with any severe headache, shortness of breath, chest pain, or visual " disturbance  Blood pressure monitoring at home; to L&D for evaluation if > 150/100  Start twice weekly antepartum testing with primary OB  Weekly preeclampsia labs with primary OB  (CBC, CMP, urine P/C ratio)  Repeat growth US in 4 weeks with primary OB  Delivery at 37 weeks, earlier if clinically indicated  Do not recommend outpatient adjustment of procardia at this advanced gestational age. If concern that blood pressure medications need to be adjusted prior to 34 weeks, this should be done as an inpatient in the hospital and patient should remain inpatient until delivery at 34 weeks  Delivery may be indicated earlier if severe features develop or if there is non-reassuring maternal or fetal status  If severe features/BP develop at 34 weeks or beyond, recommend delivery. If lab abnormalities are present at this gestational age or beyond, admit to hospital and proceed with delivery.    F/u with MFM prn

## 2024-01-11 NOTE — ASSESSMENT & PLAN NOTE
Prior consultation reviewed.  Prenatal labs and vitals reviewed.  Asymptomatic.  +FM.  Recent labs reviewed-normal CMP/CBC. P/C now 0.32.    Discussed can be normal to have increase in proteinuria in third trimester, but could also represent early signs of superimposed preeclampsia. In absence of severe features of preeclampsia, recommend delivery at 37 weeks. Risks of preeclampsia reviewed and strict precautions discussed.  We discussed monitoring strategy as an outpatient, assuming this is related to superimposed preeclampsia.  Recommend:  Fetal movement monitoring at home twice daily; to L&D with less than 10 movements in 2 hours  Preeclampsia symptom monitoring at home; to L&D with any severe headache, shortness of breath, chest pain, or visual disturbance  Blood pressure monitoring at home; to L&D for evaluation if > 150/100  Start twice weekly antepartum testing with primary OB  Weekly preeclampsia labs with primary OB  (CBC, CMP, urine P/C ratio)  Repeat growth US in 4 weeks with primary OB  Delivery at 37 weeks, earlier if clinically indicated  Do not recommend outpatient adjustment of procardia at this advanced gestational age. If concern that blood pressure medications need to be adjusted prior to 34 weeks, this should be done as an inpatient in the hospital and patient should remain inpatient until delivery at 34 weeks  Delivery may be indicated earlier if severe features develop or if there is non-reassuring maternal or fetal status  If severe features/BP develop at 34 weeks or beyond, recommend delivery. If lab abnormalities are present at this gestational age or beyond, admit to hospital and proceed with delivery.

## 2024-01-19 ENCOUNTER — HOSPITAL ENCOUNTER (OUTPATIENT)
Dept: OBSTETRICS AND GYNECOLOGY | Facility: HOSPITAL | Age: 39
Discharge: HOME | End: 2024-01-19
Attending: OBSTETRICS & GYNECOLOGY
Payer: MEDICAID

## 2024-01-19 ENCOUNTER — ROUTINE PRENATAL (OUTPATIENT)
Dept: OBSTETRICS AND GYNECOLOGY | Facility: CLINIC | Age: 39
End: 2024-01-19
Payer: MEDICAID

## 2024-01-19 VITALS
HEART RATE: 105 BPM | DIASTOLIC BLOOD PRESSURE: 86 MMHG | BODY MASS INDEX: 45.83 KG/M2 | SYSTOLIC BLOOD PRESSURE: 132 MMHG | WEIGHT: 283.94 LBS

## 2024-01-19 VITALS
HEART RATE: 104 BPM | OXYGEN SATURATION: 99 % | SYSTOLIC BLOOD PRESSURE: 129 MMHG | RESPIRATION RATE: 18 BRPM | DIASTOLIC BLOOD PRESSURE: 69 MMHG

## 2024-01-19 DIAGNOSIS — O16.3 HYPERTENSION AFFECTING PREGNANCY IN THIRD TRIMESTER: Primary | ICD-10-CM

## 2024-01-19 DIAGNOSIS — O12.13 PROTEINURIA AFFECTING PREGNANCY IN THIRD TRIMESTER: ICD-10-CM

## 2024-01-19 DIAGNOSIS — O16.3 HYPERTENSION AFFECTING PREGNANCY IN THIRD TRIMESTER: ICD-10-CM

## 2024-01-19 DIAGNOSIS — Z3A.32 32 WEEKS GESTATION OF PREGNANCY: ICD-10-CM

## 2024-01-19 PROCEDURE — 99999 PR PBB SHADOW E&M-EST. PATIENT-LVL III: CPT | Mod: PBBFAC,,, | Performed by: OBSTETRICS & GYNECOLOGY

## 2024-01-19 PROCEDURE — 59025 FETAL NON-STRESS TEST: CPT

## 2024-01-19 PROCEDURE — 59025 FETAL NON-STRESS TEST: CPT | Mod: 26,,, | Performed by: OBSTETRICS & GYNECOLOGY

## 2024-01-19 PROCEDURE — 99213 OFFICE O/P EST LOW 20 MIN: CPT | Mod: PBBFAC,TH,PO,25 | Performed by: OBSTETRICS & GYNECOLOGY

## 2024-01-19 PROCEDURE — 99213 OFFICE O/P EST LOW 20 MIN: CPT | Mod: TH,S$PBB,25, | Performed by: OBSTETRICS & GYNECOLOGY

## 2024-01-19 NOTE — PROGRESS NOTES
Patient with no complaints. Denies vaginal bleeding or contractions. Good FM. Reports tolerating procardia w/ at home BP readings w/ systolics in the 130s and systolic in the 70-80s. Follows with MFM due to AMA and chronic hypertension w/ proteinuria. RTC in 2 weeks.     Vitals signs, FHTs, urine dip, and PE findings documented, reviewed and available in OB flow chart.      /86   FHTs 140s  Twice weekly NFTs - orders placed  Recheck labs at next weeks visit w/ CMP, CBC, and urine P/C ratio  Check BP daily and continue procardia 30 mg daily    I spent a total of 20 minutes on the day of the visit.This includes face to face time and non-face to face time preparing to see the patient (eg, review of tests), Obtaining and/or reviewing separately obtained history, Documenting clinical information in the electronic or other health record, Independently interpreting resultsand communicating results to the patient/family/caregiver, or Care coordination.     Coffective counseling sheet Build Your Team discussed with mother. Reinforced importance of early identification of support team including champion, OB provider, pediatrician and local community resources. Encouraged mother to download Coffective mobile michael if she has not already done so.  Mother verbalizes understanding. Also discussed quiet time and delayed bathing.

## 2024-01-19 NOTE — PROGRESS NOTES
2024    32w4d  Indication for Test: CHTN  BP: 129/69  Pulse: 104  Resp: 18  SpO2: 99 %  Nonstress Test Baby A  Variability: Moderate  Decels: None  Accels: Present  Baseline FHR: 145  Interpretation Baby A  Nonstress Test Interpretation: Reactive  Overall Impression: Reassuring    Patient has no complaints today and reports positive fetal movement. Fetal movement noted during testing.       Last Resulted: 24 10:34 CST

## 2024-01-23 ENCOUNTER — HOSPITAL ENCOUNTER (OUTPATIENT)
Facility: HOSPITAL | Age: 39
LOS: 1 days | Discharge: HOME | End: 2024-01-25
Attending: OBSTETRICS & GYNECOLOGY | Admitting: OBSTETRICS & GYNECOLOGY
Payer: MEDICAID

## 2024-01-23 ENCOUNTER — HOSPITAL ENCOUNTER (OUTPATIENT)
Dept: OBSTETRICS AND GYNECOLOGY | Facility: HOSPITAL | Age: 39
Discharge: HOME | End: 2024-01-23
Attending: OBSTETRICS & GYNECOLOGY
Payer: MEDICAID

## 2024-01-23 VITALS — SYSTOLIC BLOOD PRESSURE: 186 MMHG | DIASTOLIC BLOOD PRESSURE: 101 MMHG | HEART RATE: 109 BPM

## 2024-01-23 DIAGNOSIS — I10 HYPERTENSION: ICD-10-CM

## 2024-01-23 DIAGNOSIS — O16.3 HYPERTENSION AFFECTING PREGNANCY IN THIRD TRIMESTER: Primary | ICD-10-CM

## 2024-01-23 DIAGNOSIS — R00.0 SINUS TACHYCARDIA: ICD-10-CM

## 2024-01-23 DIAGNOSIS — O16.3 HYPERTENSION AFFECTING PREGNANCY IN THIRD TRIMESTER: ICD-10-CM

## 2024-01-23 LAB
ALBUMIN SERPL BCP-MCNC: 2.6 G/DL (ref 3.5–5.2)
ALP SERPL-CCNC: 114 U/L (ref 55–135)
ALT SERPL W/O P-5'-P-CCNC: 13 U/L (ref 10–44)
ANION GAP SERPL CALC-SCNC: 11 MMOL/L (ref 8–16)
AST SERPL-CCNC: 13 U/L (ref 10–40)
BASOPHILS # BLD AUTO: 0.02 K/UL (ref 0–0.2)
BASOPHILS NFR BLD: 0.2 % (ref 0–1.9)
BILIRUB SERPL-MCNC: 0.2 MG/DL (ref 0.1–1)
BUN SERPL-MCNC: 7 MG/DL (ref 6–20)
CALCIUM SERPL-MCNC: 9.1 MG/DL (ref 8.7–10.5)
CHLORIDE SERPL-SCNC: 107 MMOL/L (ref 95–110)
CO2 SERPL-SCNC: 19 MMOL/L (ref 23–29)
CREAT SERPL-MCNC: 0.7 MG/DL (ref 0.5–1.4)
CREAT UR-MCNC: 42.5 MG/DL (ref 15–325)
DIFFERENTIAL METHOD BLD: ABNORMAL
EOSINOPHIL # BLD AUTO: 0.2 K/UL (ref 0–0.5)
EOSINOPHIL NFR BLD: 2.3 % (ref 0–8)
ERYTHROCYTE [DISTWIDTH] IN BLOOD BY AUTOMATED COUNT: 13.2 % (ref 11.5–14.5)
EST. GFR  (NO RACE VARIABLE): >60 ML/MIN/1.73 M^2
GLUCOSE SERPL-MCNC: 80 MG/DL (ref 70–110)
HCT VFR BLD AUTO: 35.5 % (ref 37–48.5)
HGB BLD-MCNC: 11.4 G/DL (ref 12–16)
IMM GRANULOCYTES # BLD AUTO: 0.05 K/UL (ref 0–0.04)
IMM GRANULOCYTES NFR BLD AUTO: 0.5 % (ref 0–0.5)
LYMPHOCYTES # BLD AUTO: 1.4 K/UL (ref 1–4.8)
LYMPHOCYTES NFR BLD: 14.2 % (ref 18–48)
MCH RBC QN AUTO: 28.4 PG (ref 27–31)
MCHC RBC AUTO-ENTMCNC: 32.1 G/DL (ref 32–36)
MCV RBC AUTO: 89 FL (ref 82–98)
MONOCYTES # BLD AUTO: 0.6 K/UL (ref 0.3–1)
MONOCYTES NFR BLD: 5.7 % (ref 4–15)
NEUTROPHILS # BLD AUTO: 7.7 K/UL (ref 1.8–7.7)
NEUTROPHILS NFR BLD: 77.1 % (ref 38–73)
NRBC BLD-RTO: 0 /100 WBC
PLATELET # BLD AUTO: 210 K/UL (ref 150–450)
PMV BLD AUTO: 11.1 FL (ref 9.2–12.9)
POTASSIUM SERPL-SCNC: 3.8 MMOL/L (ref 3.5–5.1)
PROT SERPL-MCNC: 7.4 G/DL (ref 6–8.4)
PROT UR-MCNC: 27 MG/DL (ref 0–15)
PROT/CREAT UR: 0.64 MG/G{CREAT} (ref 0–0.2)
RBC # BLD AUTO: 4.01 M/UL (ref 4–5.4)
SODIUM SERPL-SCNC: 137 MMOL/L (ref 136–145)
WBC # BLD AUTO: 10.01 K/UL (ref 3.9–12.7)

## 2024-01-23 PROCEDURE — 63600175 PHARM REV CODE 636 W HCPCS: Performed by: OBSTETRICS & GYNECOLOGY

## 2024-01-23 PROCEDURE — 99223 1ST HOSP IP/OBS HIGH 75: CPT | Mod: 25,,, | Performed by: OBSTETRICS & GYNECOLOGY

## 2024-01-23 PROCEDURE — 84156 ASSAY OF PROTEIN URINE: CPT | Performed by: OBSTETRICS & GYNECOLOGY

## 2024-01-23 PROCEDURE — 93010 ELECTROCARDIOGRAM REPORT: CPT | Mod: ,,, | Performed by: INTERNAL MEDICINE

## 2024-01-23 PROCEDURE — 59025 FETAL NON-STRESS TEST: CPT

## 2024-01-23 PROCEDURE — 93005 ELECTROCARDIOGRAM TRACING: CPT

## 2024-01-23 PROCEDURE — 36415 COLL VENOUS BLD VENIPUNCTURE: CPT | Performed by: OBSTETRICS & GYNECOLOGY

## 2024-01-23 PROCEDURE — 80053 COMPREHEN METABOLIC PANEL: CPT | Performed by: OBSTETRICS & GYNECOLOGY

## 2024-01-23 PROCEDURE — 59025 FETAL NON-STRESS TEST: CPT | Mod: 26,,, | Performed by: OBSTETRICS & GYNECOLOGY

## 2024-01-23 PROCEDURE — 85025 COMPLETE CBC W/AUTO DIFF WBC: CPT | Performed by: OBSTETRICS & GYNECOLOGY

## 2024-01-23 RX ORDER — NIFEDIPINE 30 MG/1
30 TABLET, EXTENDED RELEASE ORAL DAILY
Status: DISCONTINUED | OUTPATIENT
Start: 2024-01-24 | End: 2024-01-25 | Stop reason: HOSPADM

## 2024-01-23 RX ORDER — LABETALOL HYDROCHLORIDE 5 MG/ML
40 INJECTION, SOLUTION INTRAVENOUS ONCE AS NEEDED
Status: DISCONTINUED | OUTPATIENT
Start: 2024-01-23 | End: 2024-01-25 | Stop reason: HOSPADM

## 2024-01-23 RX ORDER — LABETALOL HYDROCHLORIDE 5 MG/ML
80 INJECTION, SOLUTION INTRAVENOUS ONCE AS NEEDED
Status: DISCONTINUED | OUTPATIENT
Start: 2024-01-23 | End: 2024-01-25 | Stop reason: HOSPADM

## 2024-01-23 RX ORDER — HYDRALAZINE HYDROCHLORIDE 20 MG/ML
10 INJECTION INTRAMUSCULAR; INTRAVENOUS ONCE AS NEEDED
Status: DISCONTINUED | OUTPATIENT
Start: 2024-01-23 | End: 2024-01-25 | Stop reason: HOSPADM

## 2024-01-23 RX ORDER — LABETALOL HYDROCHLORIDE 5 MG/ML
20 INJECTION, SOLUTION INTRAVENOUS ONCE AS NEEDED
Status: DISCONTINUED | OUTPATIENT
Start: 2024-01-23 | End: 2024-01-25 | Stop reason: HOSPADM

## 2024-01-23 RX ORDER — DEXAMETHASONE SODIUM PHOSPHATE 4 MG/ML
6 INJECTION, SOLUTION INTRA-ARTICULAR; INTRALESIONAL; INTRAMUSCULAR; INTRAVENOUS; SOFT TISSUE EVERY 12 HOURS
Status: COMPLETED | OUTPATIENT
Start: 2024-01-23 | End: 2024-01-24

## 2024-01-23 RX ADMIN — DEXAMETHASONE SODIUM PHOSPHATE 6 MG: 4 INJECTION, SOLUTION INTRA-ARTICULAR; INTRALESIONAL; INTRAMUSCULAR; INTRAVENOUS; SOFT TISSUE at 11:01

## 2024-01-23 NOTE — NURSING
Dr. Izquierdo states monitoring can be 1 hour BID as long as blood pressures stable due to difficulty keeping baby on the monitor. Elevated blood pressures will warrant continuous monitoring.

## 2024-01-23 NOTE — NURSING
Blood pressure was taken on right upper arm with large red cuff, lower right arm with blue regular cuff and then with long blue cuff on right upper arm to see what measurement is most accurate. Large red cuff is closest to correct fit according to  recommendation, will look for long large cuff to remeasure if available. Plan of care discussed, pt able to verbally recall content without difficulty. Urine was sent to lab for P/C ratio, blood specimens drawn by lab for CBC and CMP. Patient denies any headache, blurred vision or pain at this time

## 2024-01-23 NOTE — PROGRESS NOTES
1/23/2024  33w1d  Indication for Test: CHTN  BP: (!) 186/101  Pulse: 109  Nonstress Test Baby A  Variability: Moderate  Decels: None  Accels: Present  Baseline FHR: 145  Interpretation Baby A  Nonstress Test Interpretation: Reactive  Overall Impression: Reassuring    Patient had initial BP of 182/106. Repeat /101. Patient denies headache,blurred vision or epigastric pain. Gross fetal movement noted throughout testing. Dr. Izquierdo notified of elevated BPs and ordered for patient to be sent to L&D.        Last Resulted: 01/23/24 11:04 CST

## 2024-01-23 NOTE — PLAN OF CARE
Patient evaluated at bedside. Patient is a 38-year-old female  with IUP at 33w1d gestation with history of chronic hypertension on Procardia. Seen in clinic today for severe range elevated blood pressures.  Currently denies any changes in vision, lower extremity edema, abdominal pain, chest pain, or shortness a breath.     Vitals showing /77, , RR < 20, with oxygen saturation of 99% on room air. Fetal monitoring showing moderate variability, reassuring. Physical exam with tachycardia with regular rhythm, clear breath sounds bilaterally, and no edema.    Plan for continued monitoring and frequent vital checks every 15 minutes. Currently on IV steroids. Will order an EKG given sinus tachycardia. Will continue to monitor with plan for repeat  section at 34 weeks unless maternal or fetal indications arise and indicate sooner delivery.    August Germain DO  [facility], PGY-3  Obstetrics Service   2024

## 2024-01-23 NOTE — NURSING
Pt request to be off monitor for a few minutes to get out of bed and ambulate in room. Monitoring will be resumed after pt ambulates around room and sits in chair for a little while.

## 2024-01-23 NOTE — NURSING
Pt arrived to unit from prenatal testing for prolonged monitoring and testing per Dr. Izquierdo. Pt placed in triage 2 for evaluation

## 2024-01-23 NOTE — H&P
HISTORY AND PHYSICAL  ANTEPARTUM          Subjective:       Joseph Duenas is a 38 y.o.  female with IUP at 33w1d measured by ultrasound with significant hx of CHTN on Procardia had some severe range BP's in PNT today and was sent to L&D for admission . Has no pre e sx's, feels fine took procardia this am at usual time  Patient denies contractions, denies vaginal bleeding, denies LOF.   Fetal Movement: normal.     PMHx: No past medical history on file.    PSHx:   Past Surgical History:   Procedure Laterality Date     SECTION         All: Review of patient's allergies indicates:  No Known Allergies    Meds:   Medications Prior to Admission   Medication Sig Dispense Refill Last Dose    acyclovir (ZOVIRAX) 800 MG Tab Take 1 tablet (800 mg total) by mouth 5 (five) times daily. 40 tablet 0     aspirin (ECOTRIN) 81 MG EC tablet Take 81 mg by mouth once daily.       fluticasone (FLONASE) 50 mcg/actuation nasal spray 2 sprays (100 mcg total) by Each Nare route 2 (two) times daily as needed. 15 g 0     nifedipine (PROCARDIA XL) 30 MG (OSM) 24 hr tablet Take 30 mg by mouth once daily.       NIFEdipine (PROCARDIA-XL) 30 MG (OSM) 24 hr tablet Take 1 tablet (30 mg total) by mouth once daily. 30 tablet 11        SH:   Social History     Socioeconomic History    Marital status: Single   Tobacco Use    Smoking status: Never    Smokeless tobacco: Never   Substance and Sexual Activity    Alcohol use: Not Currently     Comment: occas    Drug use: No    Sexual activity: Not Currently     Partners: Male       FH:   Family History   Problem Relation Age of Onset    Hypertension Father     Arrhythmia Neg Hx     Cardiomyopathy Neg Hx     Early death Neg Hx     Heart attacks under age 50 Neg Hx     Pacemaker/defibrilator Neg Hx     Premature birth Neg Hx        OBHx:   OB History    Para Term  AB Living   5 2 2 0 2 2   SAB IAB Ectopic Multiple Live Births   1 0 0 0 2      # Outcome Date GA Lbr Paul/2nd Weight  "Sex Delivery Anes PTL Lv   5 Current            4 Term 06/04/17 38w6d  3.816 kg (8 lb 6.6 oz) M CS-LTranv EPI N       Name: SHWETA PAYTON      Apgar1: 9  Apgar5: 9   3 AB 2015           2 SAB 2013 12w0d          1 Term 01/29/07 39w0d  3.884 kg (8 lb 9 oz) M CS-LTranv  N RODOLFO      Birth Comments: per pt "toxemia at end of pregnancy"      Complications: Pre-eclampsia       Objective:       BP (!) 156/74   Pulse 98   LMP 06/05/2023   SpO2 100%     Vitals:    01/23/24 1222 01/23/24 1223 01/23/24 1227 01/23/24 1230   BP:    (!) 156/74   Pulse: 99 108 102 98   SpO2: 100%  100%        General:   alert, appears stated age, and cooperative   Lungs:   clear to auscultation bilaterally   Heart:   regular rate and rhythm   Abdomen:  soft, non-tender; bowel sounds normal; no masses,  no organomegaly and obese   Extremities minmal edema, negative erythema   FHT: 130's Cat 1 (reassuring)   No ctx's    Presentations: unsure--u/s pending   Cervix:                               Lab Review  Blood Type A POS  GBBS: not done  Rubella: Immune  RPR: NR  HIV: negative  HepB: negative  Lab Results   Component Value Date    WBC 10.01 01/23/2024    HGB 11.4 (L) 01/23/2024    HCT 35.5 (L) 01/23/2024    MCV 89 01/23/2024     01/23/2024     omponent Ref Range & Units 11:11 2 wk ago 1 mo ago 3 mo ago 7 yr ago   Protein, Urine Random 0 - 15 mg/dL 27 High  19 High  21 High  8    Creatinine, Urine 15.0 - 325.0 mg/dL 42.5 60.0 63.0 78.0 138.0 CM   Prot/Creat Ratio, Urine 0.00 - 0.20 0.64 High  0.32 High  0.33 High      LFT's ./Cr--- nml      Assessment:       33w1d weeks gestation presents for superimposed pre e   Prev c/s x 2  CHTN  AMA  obesity  Patient Active Problem List   Diagnosis    BMI 40.0-44.9, adult    Hypertension during pregnancy in second trimester    Fetal arrhythmia affecting pregnancy, antepartum          Plan:      Risks, benefits, alternatives and possible complications have been discussed in detail with the patient. "   - Consents signed and to chart  - Admit to  antefloor  Severe range BP's have not recurred while on L&D  DMZ initiated will plan on rpt c/s at 34 wks unless Maternal or fetal indications arise and indicate sooner delivery  NICU aware  IV labetalol protocol ordered but not needed yet    Cont po procardia for now at same dose  Serial labs   CFM  D/w pt and she understands  SCD's, ambulate prn

## 2024-01-24 PROCEDURE — 96372 THER/PROPH/DIAG INJ SC/IM: CPT

## 2024-01-24 PROCEDURE — 63600175 PHARM REV CODE 636 W HCPCS: Performed by: OBSTETRICS & GYNECOLOGY

## 2024-01-24 PROCEDURE — 96372 THER/PROPH/DIAG INJ SC/IM: CPT | Performed by: OBSTETRICS & GYNECOLOGY

## 2024-01-24 PROCEDURE — G0378 HOSPITAL OBSERVATION PER HR: HCPCS

## 2024-01-24 PROCEDURE — 59025 FETAL NON-STRESS TEST: CPT

## 2024-01-24 PROCEDURE — 25000003 PHARM REV CODE 250: Performed by: OBSTETRICS & GYNECOLOGY

## 2024-01-24 RX ADMIN — DEXAMETHASONE SODIUM PHOSPHATE 6 MG: 4 INJECTION, SOLUTION INTRA-ARTICULAR; INTRALESIONAL; INTRAMUSCULAR; INTRAVENOUS; SOFT TISSUE at 11:01

## 2024-01-24 RX ADMIN — NIFEDIPINE 30 MG: 30 TABLET, FILM COATED, EXTENDED RELEASE ORAL at 08:01

## 2024-01-24 NOTE — NURSING
Late entry for 1/23 2031    Pt moved to room 355     Spoke to Dr. Johnson to clarify plan of care - ok for BPs/VS to be checked q4 per antepartum pre-e protocol    MD would like pt to wear SCDs while in bed    Pt agreeable to plan of care

## 2024-01-24 NOTE — PROGRESS NOTES
OBGYN PROGRESS NOTE  2024    History of present illness:  Joseph Duenas 38 y.o. is a 38-year-old female  with IUP at 33w2d gestation with history of chronic hypertension on Procardia. Seen in clinic yesterday for severe range elevated blood pressures. Cdenies any changes in vision, lower extremity edema, abdominal pain, chest pain, or shortness a breath.      Interval Hx: No new complaints this morning. Vital signs have remained stable.    Review of Systems   Constitutional:  Negative for chills and fever.   Eyes:  Negative for blurred vision.   Respiratory:  Negative for shortness of breath.    Cardiovascular:  Negative for chest pain and leg swelling.   Gastrointestinal:  Negative for constipation and diarrhea.   Musculoskeletal:  Negative for myalgias.   Neurological:  Negative for dizziness, weakness and headaches.      Vitals:  Vitals:    24 0405   BP: 123/67   Pulse: 101   Temp: 97.7 °F (36.5 °C)        Physical Exam:  Physical Exam  Constitutional:       Appearance: Normal appearance.   Cardiovascular:      Rate and Rhythm: Normal rate and regular rhythm.      Heart sounds: Murmur heard.   Pulmonary:      Effort: Pulmonary effort is normal.      Breath sounds: No wheezing, rhonchi or rales.   Musculoskeletal:      Right lower leg: No edema.      Left lower leg: No edema.   Neurological:      Mental Status: She is alert.   Psychiatric:         Mood and Affect: Mood normal.         Behavior: Behavior normal.        Assessment/Plan:  38-year-old female  with IUP at 33w2d gestation with history of chronic hypertension on Procardia.  Here for severe range elevated blood pressures.    #33w2d gestation  #CHTN  - Plan for continued monitoring and frequent vital checks every 15 minutes. Currently on IV steroids. Will continue to monitor with plan for repeat  section at 34 weeks unless maternal or fetal indications arise and indicate sooner delivery.     Patient seen and plan  discussed with Dr. Timbo Germain, DO  [facility], PGY-3  01/24/2024

## 2024-01-24 NOTE — PROGRESS NOTES
Pt resting with no complaints denies HA, epigastric pain, and blurred vision. Pt given meds this morning, procardia and dexamethasone IM , pt also moved rooms d/t complications with her bathroom. Pt placed on the monitor for a one hour nst and strip and is reactive. Pts b/ps are stable and taken q 4 hours. Pts call bell within reach  and  and mother is at her bedside.

## 2024-01-24 NOTE — NURSING
Upon rounding, pt w/o SCDs on- stated she just needed a little break from them; they were keeping her awake. She is getting up every hour or so to ambulate to b/r; reinforced purpose of SCDs; pt understands but would like to get a little rest.   No other needs at present

## 2024-01-25 VITALS
OXYGEN SATURATION: 100 % | TEMPERATURE: 98 F | HEIGHT: 66 IN | BODY MASS INDEX: 45.63 KG/M2 | WEIGHT: 283.94 LBS | RESPIRATION RATE: 20 BRPM | HEART RATE: 107 BPM | SYSTOLIC BLOOD PRESSURE: 121 MMHG | DIASTOLIC BLOOD PRESSURE: 66 MMHG

## 2024-01-25 PROBLEM — O10.913 CHRONIC HYPERTENSION COMPLICATING OR REASON FOR CARE DURING PREGNANCY, THIRD TRIMESTER: Status: ACTIVE | Noted: 2024-01-25

## 2024-01-25 PROBLEM — O16.3 HYPERTENSION AFFECTING PREGNANCY IN THIRD TRIMESTER: Status: ACTIVE | Noted: 2024-01-25

## 2024-01-25 PROCEDURE — 99232 SBSQ HOSP IP/OBS MODERATE 35: CPT | Mod: ,,, | Performed by: OBSTETRICS & GYNECOLOGY

## 2024-01-25 PROCEDURE — 59025 FETAL NON-STRESS TEST: CPT | Mod: 76

## 2024-01-25 PROCEDURE — G0378 HOSPITAL OBSERVATION PER HR: HCPCS

## 2024-01-25 PROCEDURE — 25000003 PHARM REV CODE 250: Performed by: OBSTETRICS & GYNECOLOGY

## 2024-01-25 RX ADMIN — NIFEDIPINE 30 MG: 30 TABLET, FILM COATED, EXTENDED RELEASE ORAL at 09:01

## 2024-01-25 NOTE — PLAN OF CARE
Discharge instructions given to pt and her .  Pt verb understanding.  Labor and pre precautions given.  Pt will be back tomorrow for her nst.  She also sees dr neely at 8am in [place].  Her c-s will be Tuesday.

## 2024-01-25 NOTE — PROGRESS NOTES
[facility] - Labor & Delivery  Obstetrics  Antepartum Progress Note    Patient Name: Joseph Duenas  MRN: [id number]  Admission Date: 2024  Hospital Length of Stay: 1 days  Attending Physician: Davis Izquierdo MD  Primary Care Provider: Haley, Primary Doctor    Subjective:     Principal Problem:Chronic hypertension complicating or reason for care during pregnancy, third trimester    HPI: Joseph Duenas is a 38 y.o. female  IUP at 33w3d presented to PNT on 24 for scheduled NST. She was found to have a mildly elevated blood pressure. No headache, no blurry vision. Patient did not require IV medication for severe range BP or change in her oral anti-hypertensive (Procardia 30 xL). She was admitted for observation and started on Dexamethasone (BMZ not available).    Obstetric HPI:  Patient reports None contractions, active fetal movement, absent vaginal bleeding , absent loss of fluid      Objective:     Vital Signs (Most Recent):  Temp: 98.6 °F (37 °C) (24)  Pulse: 107 (24)  Resp: 18 (24)  BP: 133/67 (24)  SpO2: 100 % (24 1002) Vital Signs (24h Range):  Temp:  [97.7 °F (36.5 °C)-98.6 °F (37 °C)] 98.6 °F (37 °C)  Pulse:  [] 107  Resp:  [18-20] 18  SpO2:  [94 %-100 %] 100 %  BP: (126-143)/(66-84) 133/67     Weight: 128.8 kg (283 lb 15.2 oz)  Body mass index is 45.83 kg/m².    FHT: 150 bpm Cat 1 (reassuring)  TOCO:  Q 0 minutes    No intake or output data in the 24 hours ending 24 0845    Physical Exam:   Constitutional: She is oriented to person, place, and time. She appears well-developed and well-nourished.    HENT:   Head: Normocephalic and atraumatic.    Eyes: EOM are normal.     Cardiovascular:  Normal rate and regular rhythm.      Exam reveals no clubbing, no cyanosis and no edema.        Pulmonary/Chest: Effort normal.        Abdominal: Soft.             Musculoskeletal: Normal range of motion and moves all extremeties.       Neurological:  She is alert and oriented to person, place, and time.    Skin: Skin is warm and dry. No cyanosis. Nails show no clubbing.    Psychiatric: She has a normal mood and affect.         Significant Labs:  Recent Lab Results       None            Assessment/Plan:     38 y.o. female  at 33w3d for:    Active Diagnoses:    Diagnosis Date Noted POA    PRINCIPAL PROBLEM:  Chronic hypertension complicating or reason for care during pregnancy, third trimester [O10.913] 2024 Yes      Problems Resolved During this Admission:       Patient is s/p Dexamethasone. Blood pressures are in the normal - mild range. Asymptomatic.     Will d/c home. Strict precautions given for symptoms and BP monitoring - patient is on Connected MOM. Continue NST twice a week. RTC on Monday. CS scheduled for Tuesday.       Meagan Ribeiro MD  Obstetrics  [facility] - Labor & Delivery

## 2024-01-25 NOTE — DISCHARGE SUMMARY
[facility] - [facility] & Delivery  Obstetrics  Discharge Summary      Patient Name: Joseph Duenas  MRN: [id number]  Admission Date: 2024  Hospital Length of Stay: 1 days  Discharge Date and Time:  2024 8:53 AM  Attending Physician: Davis Izquierdo MD   Discharging Provider: Meagan Ribeiro MD  Primary Care Provider: Haley, Primary Doctor    HPI: Joseph Duenas is a 38 y.o. female  IUP at 33w3d presented to T on 24 for scheduled NST. She was found to have a mildly elevated blood pressure. No headache, no blurry vision. Patient did not require IV medication for severe range BP or change in her oral anti-hypertensive (Procardia 30 xL). She was admitted for observation and started on Dexamethasone (BMZ not available).     Procedure(s) (LRB):   SECTION, WITH TUBAL LIGATION (N/A)     Hospital Course: During hospitalization, BP remained in the mild range. She remained asymptomatic. Did not require an adjustment in oral medication or IV BP medication. She did complete dexamethasone series        Final Active Diagnoses:    Diagnosis Date Noted POA    PRINCIPAL PROBLEM:  Hypertension affecting pregnancy in third trimester [O16.3] 2024 Yes    Chronic hypertension complicating or reason for care during pregnancy, third trimester [O10.913] 2024 Yes    BMI 40.0-44.9, adult [Z68.41] 2017 Not Applicable      Problems Resolved During this Admission:        Labs: BMP:   Recent Labs   Lab 24  1115   GLU 80      K 3.8      CO2 19*   BUN 7   CREATININE 0.7   CALCIUM 9.1   , CMP   Recent Labs   Lab 24  1115      K 3.8      CO2 19*   GLU 80   BUN 7   CREATININE 0.7   CALCIUM 9.1   PROT 7.4   ALBUMIN 2.6*   BILITOT 0.2   ALKPHOS 114   AST 13   ALT 13   ANIONGAP 11   , and All labs within the past 24 hours have been reviewed      Immunizations       None            This patient has no babies on file.  Pending Diagnostic Studies:       None             Discharged Condition: good    Disposition: Home or Self Care    Follow Up: Monday for BP check    Patient Instructions:      Notify your health care provider if you experience any of the following:  temperature >100.4     Notify your health care provider if you experience any of the following:  persistent nausea and vomiting or diarrhea     Notify your health care provider if you experience any of the following:  severe uncontrolled pain     Notify your health care provider if you experience any of the following:  redness, tenderness, or signs of infection (pain, swelling, redness, odor or green/yellow discharge around incision site)     Notify your health care provider if you experience any of the following:  difficulty breathing or increased cough     Notify your health care provider if you experience any of the following:  severe persistent headache     Notify your health care provider if you experience any of the following:  worsening rash     Notify your health care provider if you experience any of the following:  persistent dizziness, light-headedness, or visual disturbances     Notify your health care provider if you experience any of the following:  increased confusion or weakness     Medications:  Current Discharge Medication List        CONTINUE these medications which have NOT CHANGED    Details   acyclovir (ZOVIRAX) 800 MG Tab Take 1 tablet (800 mg total) by mouth 5 (five) times daily.  Qty: 40 tablet, Refills: 0      aspirin (ECOTRIN) 81 MG EC tablet Take 81 mg by mouth once daily.      fluticasone (FLONASE) 50 mcg/actuation nasal spray 2 sprays (100 mcg total) by Each Nare route 2 (two) times daily as needed.  Qty: 15 g, Refills: 0      NIFEdipine (PROCARDIA-XL) 30 MG (OSM) 24 hr tablet Take 1 tablet (30 mg total) by mouth once daily.  Qty: 30 tablet, Refills: 11    Comments: .             Meagan Ribeiro MD  Obstetrics  [place] - Labor & Delivery

## 2024-01-26 ENCOUNTER — HOSPITAL ENCOUNTER (OUTPATIENT)
Dept: OBSTETRICS AND GYNECOLOGY | Facility: HOSPITAL | Age: 39
Discharge: HOME | End: 2024-01-26
Attending: OBSTETRICS & GYNECOLOGY
Payer: MEDICAID

## 2024-01-26 VITALS
OXYGEN SATURATION: 98 % | SYSTOLIC BLOOD PRESSURE: 143 MMHG | RESPIRATION RATE: 18 BRPM | DIASTOLIC BLOOD PRESSURE: 89 MMHG | HEART RATE: 106 BPM

## 2024-01-26 DIAGNOSIS — O16.3 HYPERTENSION AFFECTING PREGNANCY IN THIRD TRIMESTER: ICD-10-CM

## 2024-01-26 PROCEDURE — 59025 FETAL NON-STRESS TEST: CPT

## 2024-01-26 PROCEDURE — 59025 FETAL NON-STRESS TEST: CPT | Mod: 26,,, | Performed by: OBSTETRICS & GYNECOLOGY

## 2024-01-26 NOTE — PROGRESS NOTES
2024    33w4d  Indication for Test: CHTN  BP: (!) 143/89  Pulse: 106  Resp: 18  SpO2: 98 %  Nonstress Test Baby A  Variability: Moderate  Decels: None  Accels: Present  Baseline FHR: 140  Interpretation Baby A  Nonstress Test Interpretation: Reactive  Overall Impression: Reassuring    Patient has no complaints today and reports positive fetal movement. Fetal movement and hiccups noted during testing.       Last Resulted: 24 10:50 CST

## 2024-01-29 ENCOUNTER — ROUTINE PRENATAL (OUTPATIENT)
Dept: OBSTETRICS AND GYNECOLOGY | Facility: CLINIC | Age: 39
End: 2024-01-29
Payer: MEDICAID

## 2024-01-29 VITALS
BODY MASS INDEX: 46.04 KG/M2 | SYSTOLIC BLOOD PRESSURE: 144 MMHG | WEIGHT: 285.25 LBS | DIASTOLIC BLOOD PRESSURE: 97 MMHG

## 2024-01-29 DIAGNOSIS — O11.9 PRE-ECLAMPSIA ADDED TO PRE-EXISTING HYPERTENSION: Primary | ICD-10-CM

## 2024-01-29 PROCEDURE — 1111F DSCHRG MED/CURRENT MED MERGE: CPT | Mod: CPTII,,, | Performed by: OBSTETRICS & GYNECOLOGY

## 2024-01-29 PROCEDURE — 99213 OFFICE O/P EST LOW 20 MIN: CPT | Mod: TH,S$PBB,, | Performed by: OBSTETRICS & GYNECOLOGY

## 2024-01-29 PROCEDURE — 99213 OFFICE O/P EST LOW 20 MIN: CPT | Mod: PBBFAC,TH,PO | Performed by: OBSTETRICS & GYNECOLOGY

## 2024-01-29 PROCEDURE — 99999 PR PBB SHADOW E&M-EST. PATIENT-LVL III: CPT | Mod: PBBFAC,,, | Performed by: OBSTETRICS & GYNECOLOGY

## 2024-01-29 NOTE — PROGRESS NOTES
KAROL PACHECO on procardia    Blood pressure readings at home with systolics in the 140s and diastolics in the 90s. In office /97 with repeat 139/92. Denies vaginal bleeding or contractions. Denies HA, SOB, RUQ abdominal pain, lower ext edema. Good FM. Scheduled for  tomorrow 2024. Return precautions discussed w/ patient.     Vitals signs, FHTs, urine dip, and PE findings documented, reviewed and available in OB flow chart.       I spent a total of 20 minutes on the day of the visit.This includes face to face time and non-face to face time preparing to see the patient (eg, review of tests), Obtaining and/or reviewing separately obtained history, Documenting clinical information in the electronic or other health record, Independently interpreting resultsand communicating results to the patient/family/caregiver, or Care coordination.      August Germain DO  [facility], PGY-3  2024

## 2024-01-30 ENCOUNTER — ANESTHESIA EVENT (OUTPATIENT)
Dept: OBSTETRICS AND GYNECOLOGY | Facility: HOSPITAL | Age: 39
End: 2024-01-30
Payer: MEDICAID

## 2024-01-30 ENCOUNTER — ANESTHESIA (OUTPATIENT)
Dept: OBSTETRICS AND GYNECOLOGY | Facility: HOSPITAL | Age: 39
End: 2024-01-30
Payer: MEDICAID

## 2024-01-30 ENCOUNTER — HOSPITAL ENCOUNTER (INPATIENT)
Facility: HOSPITAL | Age: 39
LOS: 4 days | Discharge: HOME | End: 2024-02-03
Attending: OBSTETRICS & GYNECOLOGY | Admitting: OBSTETRICS & GYNECOLOGY
Payer: MEDICAID

## 2024-01-30 DIAGNOSIS — Z78.9 PATIENT IS SCHEDULED FOR SURGICAL PROCEDURE: ICD-10-CM

## 2024-01-30 DIAGNOSIS — O16.3 HYPERTENSION AFFECTING PREGNANCY IN THIRD TRIMESTER: ICD-10-CM

## 2024-01-30 DIAGNOSIS — O10.913 CHRONIC HYPERTENSION COMPLICATING OR REASON FOR CARE DURING PREGNANCY, THIRD TRIMESTER: ICD-10-CM

## 2024-01-30 LAB
ABO + RH BLD: NORMAL
BASOPHILS # BLD AUTO: ABNORMAL K/UL (ref 0–0.2)
BASOPHILS NFR BLD: 0 % (ref 0–1.9)
BLD GP AB SCN CELLS X3 SERPL QL: NORMAL
DIFFERENTIAL METHOD BLD: ABNORMAL
EOSINOPHIL # BLD AUTO: ABNORMAL K/UL (ref 0–0.5)
EOSINOPHIL NFR BLD: 1 % (ref 0–8)
ERYTHROCYTE [DISTWIDTH] IN BLOOD BY AUTOMATED COUNT: 13.2 % (ref 11.5–14.5)
HCT VFR BLD AUTO: 34.3 % (ref 37–48.5)
HGB BLD-MCNC: 11.3 G/DL (ref 12–16)
HIV 1+2 AB+HIV1 P24 AG SERPL QL IA: NORMAL
HIV1+2 IGG SERPL QL IA.RAPID: NORMAL
IMM GRANULOCYTES # BLD AUTO: ABNORMAL K/UL (ref 0–0.04)
IMM GRANULOCYTES NFR BLD AUTO: ABNORMAL % (ref 0–0.5)
LYMPHOCYTES # BLD AUTO: ABNORMAL K/UL (ref 1–4.8)
LYMPHOCYTES NFR BLD: 16 % (ref 18–48)
MCH RBC QN AUTO: 28.5 PG (ref 27–31)
MCHC RBC AUTO-ENTMCNC: 32.9 G/DL (ref 32–36)
MCV RBC AUTO: 86 FL (ref 82–98)
MONOCYTES # BLD AUTO: ABNORMAL K/UL (ref 0.3–1)
MONOCYTES NFR BLD: 5 % (ref 4–15)
NEUTROPHILS NFR BLD: 78 % (ref 38–73)
NRBC BLD-RTO: 0 /100 WBC
PCO2 BLDA: 26.9 MMHG
PCO2 BLDA: 92.7 MMHG
PH SMN: 6.94 [PH]
PH SMN: 7.21 [PH]
PLATELET # BLD AUTO: 214 K/UL (ref 150–450)
PLATELET BLD QL SMEAR: ABNORMAL
PMV BLD AUTO: 11.4 FL (ref 9.2–12.9)
PO2 BLDA: 22.1 MMHG
PO2 BLDA: 30.7 MMHG
POC BASE DEFICIT: -13.8 MMOL/L
POC BASE DEFICIT: -15.8 MMOL/L
POC HCO3: 10.6 MMOL/L
POC HCO3: 20 MMOL/L
POC PERFORMED BY: NORMAL
POC PERFORMED BY: NORMAL
POC SATURATED O2: 26.3 %
POC SATURATED O2: 66.3 %
RBC # BLD AUTO: 3.97 M/UL (ref 4–5.4)
SPECIMEN SOURCE: NORMAL
SPECIMEN SOURCE: NORMAL
WBC # BLD AUTO: 11.11 K/UL (ref 3.9–12.7)

## 2024-01-30 PROCEDURE — 86901 BLOOD TYPING SEROLOGIC RH(D): CPT | Performed by: OBSTETRICS & GYNECOLOGY

## 2024-01-30 PROCEDURE — 71000033 HC RECOVERY, INTIAL HOUR: Performed by: OBSTETRICS & GYNECOLOGY

## 2024-01-30 PROCEDURE — 0UB70ZZ EXCISION OF BILATERAL FALLOPIAN TUBES, OPEN APPROACH: ICD-10-PCS | Performed by: OBSTETRICS & GYNECOLOGY

## 2024-01-30 PROCEDURE — 25000003 PHARM REV CODE 250: Performed by: OBSTETRICS & GYNECOLOGY

## 2024-01-30 PROCEDURE — 85027 COMPLETE CBC AUTOMATED: CPT | Performed by: OBSTETRICS & GYNECOLOGY

## 2024-01-30 PROCEDURE — 63600175 PHARM REV CODE 636 W HCPCS: Performed by: NURSE ANESTHETIST, CERTIFIED REGISTERED

## 2024-01-30 PROCEDURE — 88307 TISSUE EXAM BY PATHOLOGIST: CPT | Performed by: PATHOLOGY

## 2024-01-30 PROCEDURE — 86592 SYPHILIS TEST NON-TREP QUAL: CPT | Performed by: OBSTETRICS & GYNECOLOGY

## 2024-01-30 PROCEDURE — 27201423 OPTIME MED/SURG SUP & DEVICES STERILE SUPPLY: Performed by: OBSTETRICS & GYNECOLOGY

## 2024-01-30 PROCEDURE — 59514 CESAREAN DELIVERY ONLY: CPT | Mod: QZ,,, | Performed by: NURSE ANESTHETIST, CERTIFIED REGISTERED

## 2024-01-30 PROCEDURE — 11000001 HC ACUTE MED/SURG PRIVATE ROOM

## 2024-01-30 PROCEDURE — 88307 TISSUE EXAM BY PATHOLOGIST: CPT | Mod: 26,,, | Performed by: PATHOLOGY

## 2024-01-30 PROCEDURE — 85007 BL SMEAR W/DIFF WBC COUNT: CPT | Performed by: OBSTETRICS & GYNECOLOGY

## 2024-01-30 PROCEDURE — 59514 CESAREAN DELIVERY ONLY: CPT | Mod: AT,,, | Performed by: OBSTETRICS & GYNECOLOGY

## 2024-01-30 PROCEDURE — 25000003 PHARM REV CODE 250: Performed by: NURSE ANESTHETIST, CERTIFIED REGISTERED

## 2024-01-30 PROCEDURE — 88302 TISSUE EXAM BY PATHOLOGIST: CPT | Mod: 26,,, | Performed by: PATHOLOGY

## 2024-01-30 PROCEDURE — 63600175 PHARM REV CODE 636 W HCPCS: Performed by: OBSTETRICS & GYNECOLOGY

## 2024-01-30 PROCEDURE — 25000003 PHARM REV CODE 250: Performed by: ANESTHESIOLOGY

## 2024-01-30 PROCEDURE — 88302 TISSUE EXAM BY PATHOLOGIST: CPT | Performed by: PATHOLOGY

## 2024-01-30 PROCEDURE — 58611 LIGATE OVIDUCT(S) ADD-ON: CPT | Mod: ,,, | Performed by: OBSTETRICS & GYNECOLOGY

## 2024-01-30 PROCEDURE — 37000009 HC ANESTHESIA EA ADD 15 MINS: Performed by: OBSTETRICS & GYNECOLOGY

## 2024-01-30 PROCEDURE — 36416 COLLJ CAPILLARY BLOOD SPEC: CPT

## 2024-01-30 PROCEDURE — 99900035 HC TECH TIME PER 15 MIN (STAT)

## 2024-01-30 PROCEDURE — 86703 HIV-1/HIV-2 1 RESULT ANTBDY: CPT | Performed by: OBSTETRICS & GYNECOLOGY

## 2024-01-30 PROCEDURE — 36004724 HC OB OR TIME LEV III - 1ST 15 MIN: Performed by: OBSTETRICS & GYNECOLOGY

## 2024-01-30 PROCEDURE — 71000039 HC RECOVERY, EACH ADD'L HOUR: Performed by: OBSTETRICS & GYNECOLOGY

## 2024-01-30 PROCEDURE — 0JNC0ZZ RELEASE PELVIC REGION SUBCUTANEOUS TISSUE AND FASCIA, OPEN APPROACH: ICD-10-PCS | Performed by: OBSTETRICS & GYNECOLOGY

## 2024-01-30 PROCEDURE — 27200688 HC TRAY, SPINAL-HYPER/ ISOBARIC: Performed by: ANESTHESIOLOGY

## 2024-01-30 PROCEDURE — 72100002 HC LABOR CARE, 1ST 8 HOURS

## 2024-01-30 PROCEDURE — 36004725 HC OB OR TIME LEV III - EA ADD 15 MIN: Performed by: OBSTETRICS & GYNECOLOGY

## 2024-01-30 PROCEDURE — C1751 CATH, INF, PER/CENT/MIDLINE: HCPCS | Performed by: ANESTHESIOLOGY

## 2024-01-30 PROCEDURE — 82803 BLOOD GASES ANY COMBINATION: CPT

## 2024-01-30 PROCEDURE — 87389 HIV-1 AG W/HIV-1&-2 AB AG IA: CPT | Performed by: OBSTETRICS & GYNECOLOGY

## 2024-01-30 PROCEDURE — 37000009 HC ANESTHESIA EA ADD 15 MINS: Mod: SZN

## 2024-01-30 PROCEDURE — 37000008 HC ANESTHESIA 1ST 15 MINUTES: Performed by: OBSTETRICS & GYNECOLOGY

## 2024-01-30 PROCEDURE — 51702 INSERT TEMP BLADDER CATH: CPT

## 2024-01-30 PROCEDURE — 36004725 HC OB OR TIME LEV III - EA ADD 15 MIN: Mod: SZN

## 2024-01-30 RX ORDER — AMOXICILLIN 250 MG
1 CAPSULE ORAL NIGHTLY PRN
Status: DISCONTINUED | OUTPATIENT
Start: 2024-01-30 | End: 2024-02-03 | Stop reason: HOSPADM

## 2024-01-30 RX ORDER — MUPIROCIN 20 MG/G
OINTMENT TOPICAL 2 TIMES DAILY
Status: DISCONTINUED | OUTPATIENT
Start: 2024-01-30 | End: 2024-02-03 | Stop reason: HOSPADM

## 2024-01-30 RX ORDER — OXYTOCIN/RINGER'S LACTATE 30/500 ML
95 PLASTIC BAG, INJECTION (ML) INTRAVENOUS ONCE
Status: DISCONTINUED | OUTPATIENT
Start: 2024-01-30 | End: 2024-02-03 | Stop reason: HOSPADM

## 2024-01-30 RX ORDER — METHYLERGONOVINE MALEATE 0.2 MG/ML
200 INJECTION INTRAVENOUS ONCE AS NEEDED
Status: DISCONTINUED | OUTPATIENT
Start: 2024-01-30 | End: 2024-02-03 | Stop reason: HOSPADM

## 2024-01-30 RX ORDER — OXYCODONE AND ACETAMINOPHEN 10; 325 MG/1; MG/1
1 TABLET ORAL EVERY 4 HOURS PRN
Status: DISCONTINUED | OUTPATIENT
Start: 2024-01-30 | End: 2024-01-30

## 2024-01-30 RX ORDER — BUPIVACAINE HYDROCHLORIDE 7.5 MG/ML
INJECTION, SOLUTION INTRASPINAL
Status: DISCONTINUED | OUTPATIENT
Start: 2024-01-30 | End: 2024-01-30

## 2024-01-30 RX ORDER — MISOPROSTOL 200 UG/1
800 TABLET ORAL
Status: DISCONTINUED | OUTPATIENT
Start: 2024-01-30 | End: 2024-02-03 | Stop reason: HOSPADM

## 2024-01-30 RX ORDER — DIPHENHYDRAMINE HYDROCHLORIDE 50 MG/ML
25 INJECTION INTRAMUSCULAR; INTRAVENOUS EVERY 4 HOURS PRN
Status: DISCONTINUED | OUTPATIENT
Start: 2024-01-30 | End: 2024-02-03 | Stop reason: HOSPADM

## 2024-01-30 RX ORDER — ONDANSETRON HYDROCHLORIDE 2 MG/ML
INJECTION, SOLUTION INTRAVENOUS
Status: DISCONTINUED | OUTPATIENT
Start: 2024-01-30 | End: 2024-01-30

## 2024-01-30 RX ORDER — HYDROCORTISONE 25 MG/G
CREAM TOPICAL 3 TIMES DAILY PRN
Status: DISCONTINUED | OUTPATIENT
Start: 2024-01-30 | End: 2024-02-03 | Stop reason: HOSPADM

## 2024-01-30 RX ORDER — DIPHENHYDRAMINE HCL 25 MG
25 CAPSULE ORAL EVERY 4 HOURS PRN
Status: DISCONTINUED | OUTPATIENT
Start: 2024-01-30 | End: 2024-02-03 | Stop reason: HOSPADM

## 2024-01-30 RX ORDER — FAMOTIDINE 10 MG/ML
20 INJECTION INTRAVENOUS
Status: DISCONTINUED | OUTPATIENT
Start: 2024-01-30 | End: 2024-02-03 | Stop reason: HOSPADM

## 2024-01-30 RX ORDER — DEXAMETHASONE SODIUM PHOSPHATE 4 MG/ML
INJECTION, SOLUTION INTRA-ARTICULAR; INTRALESIONAL; INTRAMUSCULAR; INTRAVENOUS; SOFT TISSUE
Status: DISCONTINUED | OUTPATIENT
Start: 2024-01-30 | End: 2024-01-30

## 2024-01-30 RX ORDER — KETOROLAC TROMETHAMINE 30 MG/ML
30 INJECTION, SOLUTION INTRAMUSCULAR; INTRAVENOUS EVERY 6 HOURS
Status: DISCONTINUED | OUTPATIENT
Start: 2024-01-30 | End: 2024-01-30

## 2024-01-30 RX ORDER — LABETALOL HYDROCHLORIDE 5 MG/ML
40 INJECTION, SOLUTION INTRAVENOUS ONCE AS NEEDED
Status: DISCONTINUED | OUTPATIENT
Start: 2024-01-30 | End: 2024-02-03 | Stop reason: HOSPADM

## 2024-01-30 RX ORDER — DEXMEDETOMIDINE HYDROCHLORIDE 100 UG/ML
INJECTION, SOLUTION INTRAVENOUS
Status: DISCONTINUED | OUTPATIENT
Start: 2024-01-30 | End: 2024-01-30

## 2024-01-30 RX ORDER — OXYTOCIN 10 [USP'U]/ML
10 INJECTION, SOLUTION INTRAMUSCULAR; INTRAVENOUS ONCE AS NEEDED
Status: DISCONTINUED | OUTPATIENT
Start: 2024-01-30 | End: 2024-02-03 | Stop reason: HOSPADM

## 2024-01-30 RX ORDER — MISOPROSTOL 200 UG/1
800 TABLET ORAL ONCE AS NEEDED
Status: DISCONTINUED | OUTPATIENT
Start: 2024-01-30 | End: 2024-02-03 | Stop reason: HOSPADM

## 2024-01-30 RX ORDER — MORPHINE SULFATE 0.5 MG/ML
INJECTION, SOLUTION EPIDURAL; INTRATHECAL; INTRAVENOUS
Status: DISCONTINUED | OUTPATIENT
Start: 2024-01-30 | End: 2024-01-30

## 2024-01-30 RX ORDER — BISACODYL 10 MG/1
10 SUPPOSITORY RECTAL ONCE AS NEEDED
Status: DISCONTINUED | OUTPATIENT
Start: 2024-01-30 | End: 2024-02-03 | Stop reason: HOSPADM

## 2024-01-30 RX ORDER — ONDANSETRON HYDROCHLORIDE 2 MG/ML
4 INJECTION, SOLUTION INTRAVENOUS EVERY 6 HOURS PRN
Status: ACTIVE | OUTPATIENT
Start: 2024-01-30 | End: 2024-01-31

## 2024-01-30 RX ORDER — OXYCODONE AND ACETAMINOPHEN 10; 325 MG/1; MG/1
1 TABLET ORAL EVERY 4 HOURS PRN
Status: DISCONTINUED | OUTPATIENT
Start: 2024-01-31 | End: 2024-02-03 | Stop reason: HOSPADM

## 2024-01-30 RX ORDER — CARBOPROST TROMETHAMINE 250 UG/ML
250 INJECTION, SOLUTION INTRAMUSCULAR
Status: DISCONTINUED | OUTPATIENT
Start: 2024-01-30 | End: 2024-02-03 | Stop reason: HOSPADM

## 2024-01-30 RX ORDER — SODIUM CITRATE AND CITRIC ACID MONOHYDRATE 334; 500 MG/5ML; MG/5ML
30 SOLUTION ORAL
Status: DISCONTINUED | OUTPATIENT
Start: 2024-01-30 | End: 2024-02-03 | Stop reason: HOSPADM

## 2024-01-30 RX ORDER — ACETAMINOPHEN 325 MG/1
650 TABLET ORAL EVERY 6 HOURS
Status: DISPENSED | OUTPATIENT
Start: 2024-01-30 | End: 2024-01-31

## 2024-01-30 RX ORDER — MUPIROCIN 20 MG/G
OINTMENT TOPICAL
Status: DISCONTINUED | OUTPATIENT
Start: 2024-01-30 | End: 2024-02-03 | Stop reason: HOSPADM

## 2024-01-30 RX ORDER — METHYLERGONOVINE MALEATE 0.2 MG/ML
200 INJECTION INTRAVENOUS
Status: DISCONTINUED | OUTPATIENT
Start: 2024-01-30 | End: 2024-02-03 | Stop reason: HOSPADM

## 2024-01-30 RX ORDER — LABETALOL HYDROCHLORIDE 5 MG/ML
20 INJECTION, SOLUTION INTRAVENOUS ONCE AS NEEDED
Status: DISCONTINUED | OUTPATIENT
Start: 2024-01-30 | End: 2024-02-03 | Stop reason: HOSPADM

## 2024-01-30 RX ORDER — NIFEDIPINE 30 MG/1
30 TABLET, EXTENDED RELEASE ORAL DAILY
Status: DISCONTINUED | OUTPATIENT
Start: 2024-01-30 | End: 2024-01-30

## 2024-01-30 RX ORDER — FENTANYL CITRATE 50 UG/ML
INJECTION, SOLUTION INTRAMUSCULAR; INTRAVENOUS
Status: DISCONTINUED | OUTPATIENT
Start: 2024-01-30 | End: 2024-01-30

## 2024-01-30 RX ORDER — OXYTOCIN/RINGER'S LACTATE 30/500 ML
334 PLASTIC BAG, INJECTION (ML) INTRAVENOUS ONCE AS NEEDED
Status: DISCONTINUED | OUTPATIENT
Start: 2024-01-30 | End: 2024-02-03 | Stop reason: HOSPADM

## 2024-01-30 RX ORDER — LABETALOL HYDROCHLORIDE 5 MG/ML
80 INJECTION, SOLUTION INTRAVENOUS ONCE AS NEEDED
Status: DISCONTINUED | OUTPATIENT
Start: 2024-01-30 | End: 2024-02-03 | Stop reason: HOSPADM

## 2024-01-30 RX ORDER — ADHESIVE BANDAGE
10 BANDAGE TOPICAL EVERY 6 HOURS PRN
Status: DISCONTINUED | OUTPATIENT
Start: 2024-01-31 | End: 2024-02-03 | Stop reason: HOSPADM

## 2024-01-30 RX ORDER — NIFEDIPINE 30 MG/1
30 TABLET, EXTENDED RELEASE ORAL 2 TIMES DAILY
Status: DISCONTINUED | OUTPATIENT
Start: 2024-01-31 | End: 2024-02-03 | Stop reason: HOSPADM

## 2024-01-30 RX ORDER — PHENYLEPHRINE HYDROCHLORIDE 10 MG/ML
INJECTION INTRAVENOUS
Status: DISCONTINUED | OUTPATIENT
Start: 2024-01-30 | End: 2024-01-30

## 2024-01-30 RX ORDER — SODIUM CHLORIDE, SODIUM LACTATE, POTASSIUM CHLORIDE, CALCIUM CHLORIDE 600; 310; 30; 20 MG/100ML; MG/100ML; MG/100ML; MG/100ML
INJECTION, SOLUTION INTRAVENOUS CONTINUOUS
Status: DISCONTINUED | OUTPATIENT
Start: 2024-01-30 | End: 2024-02-03 | Stop reason: HOSPADM

## 2024-01-30 RX ORDER — OXYCODONE AND ACETAMINOPHEN 5; 325 MG/1; MG/1
1 TABLET ORAL EVERY 4 HOURS PRN
Status: DISCONTINUED | OUTPATIENT
Start: 2024-01-31 | End: 2024-02-03 | Stop reason: HOSPADM

## 2024-01-30 RX ORDER — OXYTOCIN/RINGER'S LACTATE 30/500 ML
95 PLASTIC BAG, INJECTION (ML) INTRAVENOUS ONCE AS NEEDED
Status: DISCONTINUED | OUTPATIENT
Start: 2024-01-30 | End: 2024-02-03 | Stop reason: HOSPADM

## 2024-01-30 RX ORDER — BUPIVACAINE HYDROCHLORIDE 2.5 MG/ML
INJECTION, SOLUTION EPIDURAL; INFILTRATION; INTRACAUDAL
Status: DISPENSED
Start: 2024-01-30 | End: 2024-01-30

## 2024-01-30 RX ORDER — DIPHENOXYLATE HYDROCHLORIDE AND ATROPINE SULFATE 2.5; .025 MG/1; MG/1
2 TABLET ORAL EVERY 6 HOURS PRN
Status: DISCONTINUED | OUTPATIENT
Start: 2024-01-30 | End: 2024-02-03 | Stop reason: HOSPADM

## 2024-01-30 RX ORDER — OXYTOCIN/RINGER'S LACTATE 30/500 ML
334 PLASTIC BAG, INJECTION (ML) INTRAVENOUS ONCE
Status: COMPLETED | OUTPATIENT
Start: 2024-01-30 | End: 2024-01-30

## 2024-01-30 RX ORDER — SIMETHICONE 80 MG
1 TABLET,CHEWABLE ORAL EVERY 6 HOURS PRN
Status: DISCONTINUED | OUTPATIENT
Start: 2024-01-30 | End: 2024-02-03 | Stop reason: HOSPADM

## 2024-01-30 RX ORDER — EPHEDRINE SULFATE 50 MG/ML
INJECTION, SOLUTION INTRAVENOUS
Status: DISCONTINUED | OUTPATIENT
Start: 2024-01-30 | End: 2024-01-30

## 2024-01-30 RX ORDER — PROCHLORPERAZINE EDISYLATE 5 MG/ML
5 INJECTION INTRAMUSCULAR; INTRAVENOUS EVERY 6 HOURS PRN
Status: DISCONTINUED | OUTPATIENT
Start: 2024-01-30 | End: 2024-02-03 | Stop reason: HOSPADM

## 2024-01-30 RX ORDER — OXYCODONE HYDROCHLORIDE 5 MG/1
10 TABLET ORAL EVERY 4 HOURS PRN
Status: DISPENSED | OUTPATIENT
Start: 2024-01-30 | End: 2024-01-31

## 2024-01-30 RX ORDER — IBUPROFEN 400 MG/1
800 TABLET ORAL 3 TIMES DAILY
Status: DISCONTINUED | OUTPATIENT
Start: 2024-01-30 | End: 2024-02-03 | Stop reason: HOSPADM

## 2024-01-30 RX ORDER — PROCHLORPERAZINE EDISYLATE 5 MG/ML
5 INJECTION INTRAMUSCULAR; INTRAVENOUS EVERY 6 HOURS PRN
Status: DISCONTINUED | OUTPATIENT
Start: 2024-01-30 | End: 2024-01-30 | Stop reason: SDUPTHER

## 2024-01-30 RX ORDER — OXYCODONE HYDROCHLORIDE 5 MG/1
5 TABLET ORAL EVERY 4 HOURS PRN
Status: ACTIVE | OUTPATIENT
Start: 2024-01-30 | End: 2024-01-31

## 2024-01-30 RX ORDER — OXYCODONE AND ACETAMINOPHEN 5; 325 MG/1; MG/1
1 TABLET ORAL EVERY 4 HOURS PRN
Status: DISCONTINUED | OUTPATIENT
Start: 2024-01-30 | End: 2024-01-30

## 2024-01-30 RX ORDER — NIFEDIPINE 30 MG/1
60 TABLET, EXTENDED RELEASE ORAL DAILY
Status: DISCONTINUED | OUTPATIENT
Start: 2024-01-31 | End: 2024-01-30

## 2024-01-30 RX ORDER — SODIUM CHLORIDE 0.9 % (FLUSH) 0.9 %
10 SYRINGE (ML) INJECTION
Status: DISCONTINUED | OUTPATIENT
Start: 2024-01-30 | End: 2024-02-03 | Stop reason: HOSPADM

## 2024-01-30 RX ORDER — ONDANSETRON 8 MG/1
8 TABLET, ORALLY DISINTEGRATING ORAL EVERY 8 HOURS PRN
Status: DISCONTINUED | OUTPATIENT
Start: 2024-01-30 | End: 2024-02-03 | Stop reason: HOSPADM

## 2024-01-30 RX ADMIN — MUPIROCIN: 20 OINTMENT TOPICAL at 09:01

## 2024-01-30 RX ADMIN — SODIUM CHLORIDE, POTASSIUM CHLORIDE, SODIUM LACTATE AND CALCIUM CHLORIDE: 600; 310; 30; 20 INJECTION, SOLUTION INTRAVENOUS at 08:01

## 2024-01-30 RX ADMIN — MORPHINE SULFATE 0.1 MG: 0.5 INJECTION, SOLUTION EPIDURAL; INTRATHECAL; INTRAVENOUS at 10:01

## 2024-01-30 RX ADMIN — SODIUM CHLORIDE, SODIUM LACTATE, POTASSIUM CHLORIDE, AND CALCIUM CHLORIDE: .6; .31; .03; .02 INJECTION, SOLUTION INTRAVENOUS at 10:01

## 2024-01-30 RX ADMIN — PHENYLEPHRINE HYDROCHLORIDE 200 MCG: 10 INJECTION INTRAVENOUS at 10:01

## 2024-01-30 RX ADMIN — SODIUM CHLORIDE, SODIUM LACTATE, POTASSIUM CHLORIDE, AND CALCIUM CHLORIDE: .6; .31; .03; .02 INJECTION, SOLUTION INTRAVENOUS at 11:01

## 2024-01-30 RX ADMIN — OXYCODONE HYDROCHLORIDE 10 MG: 5 TABLET ORAL at 03:01

## 2024-01-30 RX ADMIN — Medication 334 ML/HR: at 10:01

## 2024-01-30 RX ADMIN — NIFEDIPINE 30 MG: 30 TABLET, FILM COATED, EXTENDED RELEASE ORAL at 04:01

## 2024-01-30 RX ADMIN — DEXMEDETOMIDINE HCL 5 MCG: 100 INJECTION INTRAVENOUS at 10:01

## 2024-01-30 RX ADMIN — EPHEDRINE SULFATE 20 MG: 50 INJECTION INTRAVENOUS at 11:01

## 2024-01-30 RX ADMIN — IBUPROFEN 800 MG: 400 TABLET ORAL at 09:01

## 2024-01-30 RX ADMIN — ACETAMINOPHEN 650 MG: 325 TABLET ORAL at 09:01

## 2024-01-30 RX ADMIN — PHENYLEPHRINE HYDROCHLORIDE 100 MCG: 10 INJECTION INTRAVENOUS at 10:01

## 2024-01-30 RX ADMIN — DEXAMETHASONE SODIUM PHOSPHATE 4 MG: 4 INJECTION, SOLUTION INTRAMUSCULAR; INTRAVENOUS at 10:01

## 2024-01-30 RX ADMIN — ONDANSETRON 4 MG: 2 INJECTION, SOLUTION INTRAMUSCULAR; INTRAVENOUS at 10:01

## 2024-01-30 RX ADMIN — FENTANYL CITRATE 100 MCG: 50 INJECTION, SOLUTION INTRAMUSCULAR; INTRAVENOUS at 11:01

## 2024-01-30 RX ADMIN — PHENYLEPHRINE HYDROCHLORIDE 150 MCG: 10 INJECTION INTRAVENOUS at 10:01

## 2024-01-30 RX ADMIN — EPHEDRINE SULFATE 10 MG: 50 INJECTION INTRAVENOUS at 11:01

## 2024-01-30 RX ADMIN — DEXTROSE MONOHYDRATE 3 G: 50 INJECTION, SOLUTION INTRAVENOUS at 10:01

## 2024-01-30 RX ADMIN — BUPIVACAINE HYDROCHLORIDE IN DEXTROSE 1.6 ML: 7.5 INJECTION, SOLUTION SUBARACHNOID at 10:01

## 2024-01-30 RX ADMIN — FAMOTIDINE 20 MG: 10 INJECTION, SOLUTION INTRAVENOUS at 09:01

## 2024-01-30 NOTE — ANESTHESIA PREPROCEDURE EVALUATION
01/30/2024  Joseph Duenas is a 38 y.o., female.      Pre-op Assessment    I have reviewed the Patient Summary Reports.     I have reviewed the Nursing Notes.    I have reviewed the Medications.     Review of Systems  Anesthesia Hx:  No problems with previous Anesthesia   History of prior surgery of interest to airway management or planning:  Previous anesthesia: Epidural, Spinal        Denies Family Hx of Anesthesia complications.    Denies Personal Hx of Anesthesia complications.                    Social:  Non-Smoker, No Alcohol Use       Hematology/Oncology:  Hematology Normal   Oncology Normal                                   EENT/Dental:  EENT/Dental Normal           Cardiovascular:  Exercise tolerance: good   Denies Pacemaker. Hypertension             NYHA Classification I ECG has been reviewed.                          Pulmonary:  Pulmonary Normal                       Renal/:  Renal/ Normal                 Hepatic/GI:  Hepatic/GI Normal                 Musculoskeletal:  Musculoskeletal Normal                Neurological:  Neurology Normal                                      Endocrine:  Endocrine Normal          Morbid Obesity / BMI > 40  Psych:  Psychiatric Normal                    Physical Exam  General: Well nourished, Cooperative, Alert and Oriented    Airway:  Mallampati: II   Mouth Opening: Normal  TM Distance: Normal  Tongue: Normal  Neck ROM: Normal ROM    Dental:  Intact        Anesthesia Plan  Type of Anesthesia, risks & benefits discussed:    Anesthesia Type: Gen ETT, Epidural, Spinal, CSE  Intra-op Monitoring Plan: Standard ASA Monitors  Post Op Pain Control Plan: intrathecal opioid and multimodal analgesia  Informed Consent: Informed consent signed with the Patient and all parties understand the risks and agree with anesthesia plan.  All questions answered.   ASA Score: 3  Day of  Surgery Review of History & Physical: I have interviewed and examined the patient. I have reviewed the patient's H&P dated:     Ready For Surgery From Anesthesia Perspective.     .

## 2024-01-30 NOTE — L&D DELIVERY NOTE
[facility] - Labor & Delivery   Section   Operative Note    SUMMARY     Date of Procedure: 2024     Procedure: Procedure(s) (LRB):   SECTION, WITH TUBAL LIGATION (N/A)    Surgeon(s) and Role:     * Davis Izquierdo MD - Primary    Assisting Jenna Gu    Pre-Operative Diagnosis: Pre-eclampsia added to pre-existing hypertension [O11.9]AMA, desires BTL, prev c/s x 2 , obesity    Post-Operative Diagnosis: Post-Op Diagnosis Codes:     * Pre-eclampsia added to pre-existing hypertension [O11.9] same as preop. Extensive lower uterine segment adhesions to bladder  Will need extensive adhesiolysis for future uterine surgery  Anesthesia: Spinal/Epidural               Description of the Findings of the Procedure: Extensive lower uterine segment adhesions to bladder, omental adhesions to ant abd wall,   Nml tubes and ovaries      Complications: No    Blood Loss: * No values recorded between 2024 10:33 AM and 2024 12:03 PM *     With patient in supine position, the legs are  and Botello Catheter placed and positioning to supine done.   Abdomen prepped with Chloroprep and 3 minute drying time allowed prior to draping of the abdomen.   Time out taken with OR team members.  Pfannenstiel Incision made through the skin, transverse fascial incision developed, rectus muscles  in the midline and the peritoneum entered.      Extensive lower uterine segment adhesions to bladder adhesions noted.  The lower uterine segment  was dissected off bladder with careful sharp dissection and position of the fetus identified.   Bladder flap taken down through transverse peritoneal incision.  While doing adhesiolysis  Low Transverse Incision made through well developer lower uterine segment and extended laterally with blunt dissection.   Clear fluid noted.  Infant delivered from vertex presentation.  Cord clamped after one minute and  handed to attending nurse.  Cord blood taken, placenta  delivered.  The uterus was exteriorized.  The edges of the uterine incision are grasped with Frost clamps at the angles and the inferior and superior midline edges of the incision.    Closure with running lock 0 vicryl, starting at each angle, tying in the midline.   Observation for bleeding with suture of any bleeding along the hysterotomy line.   With good hemostasis noted, the anterior pelvis is rinsed with sterile saline.   Right and left adnexa with normal anatomy.   rt and left fallopian tube grasped with [place] clamps and suture ligated x 2 with 2-0 chromic and segment excised from each side with good hemostasis  Closure of the abdomen with 2 0 Vicryl running of the peritoneum, fascial closure with #1 vicryl  Skin closure with 4 0 Vicryl subcuticular.  Wound dressed with Aquacell.          Specimens:   Specimen (24h ago, onward)      None            Condition: Good    VTE Risk Mitigation (From admission, onward)           Ordered     IP VTE HIGH RISK PATIENT  Once         24 1202     Place sequential compression device  Until discontinued         24 120                    Disposition: PACU - hemodynamically stable.    Attestation: Good         Delivery Information for Girl Joseph Duenas    Birth information:  YOB: 2023   Time of birth: 10:56 AM   Sex: female   Head Delivery Date/Time: 2024 10:56 AM   Delivery type: , Low Vertical   Gestational Age: 34w1d        Delivery Providers    Delivering clinician: Davis Izquierdo MD   Provider Role    Magnolia Mendoza RN Registered Nurse    [facility]    Riccardo Wilson Cheryl, NNP Nurse Practitioner    Willis Albert, CRNA Nurse Anesthetist    Loi Rodríguez MD Anesthesiologist    Veronica Estrada, RN Registered Nurse              Measurements    Weight: 2391 g  Weight (lbs): 5 lb 4.3 oz  Length:          Apgars    Living status: Living  Apgar Component  Scores:  1 min.:  5 min.:  10 min.:  15 min.:  20 min.:    Skin color:         Heart rate:         Reflex irritability:         Muscle tone:         Respiratory effort:         Total:                  Operative Delivery    Forceps attempted?: No  Vacuum extractor attempted?: No         Shoulder Dystocia    Shoulder dystocia present?: No           Presentation    Presentation: Vertex  Position: Left Occiput Anterior           Interventions/Resuscitation    Method: Bulb Suctioning, Tactile Stimulation, CPAP, PPV, NICU Attended, Blow By Oxygen       Cord    Vessels: 3 vessels  Complications: None  Delayed Cord Clamping?: No  Cord Clamped Date/Time: 2024 10:56 AM  Cord Blood Disposition: Lab, Sent with Baby  Gases Sent?: Yes  Stem Cell Collection (by MD): No       Placenta    Placenta delivery date/time: 2024 1056  Placenta removal: Manual removal  Placenta appearance: Intact  Placenta disposition: Pathology, Donation           Labor Events:       labor:       Labor Onset Date/Time:         Dilation Complete Date/Time:         Start Pushing Date/Time:         Start Pushing Date/Time:       Rupture Date/Time:            Rupture type:          Fluid Amount:       Fluid Color:                steroids: Full Course     Antibiotics given for GBS:       Induction: none     Indications for induction:        Augmentation:       Indications for augmentation:       Labor complications:       Additional complications:          Cervical ripening:                     Delivery:      Episiotomy:       Indication for Episiotomy:       Perineal Lacerations:   Repaired:      Periurethral Laceration:   Repaired:     Labial Laceration:   Repaired:     Sulcus Laceration:   Repaired:     Vaginal Laceration:   Repaired:     Cervical Laceration:   Repaired:     Repair suture:       Repair # of packets:       Last Value - EBL - Nursing (mL):       Sum - EBL - Nursing (mL): 0     Last Value - EBL - Anesthesia (mL):       Calculated QBL (mL): 696      Vaginal Sweep Performed:       Surgicount Correct:       Vaginal Packing:   Quantity:       Other providers:       Anesthesia    Method: Spinal          Details (if applicable):  Trial of Labor No    Categorization: Repeat    Priority: Routine   Indications for : Repeat Section   Incision Type: low transverse     Additional  information:  Forceps:    Vacuum:    Breech:    Observed anomalies    Other (Comments):

## 2024-01-30 NOTE — PLAN OF CARE
Pt transferred to [facility] via bed, stopped in nicu for mom to see baby.  Bedside report given to Trina.

## 2024-01-30 NOTE — H&P
Subjective:      Joseph Duenas is a 38 y.o.  female at 34w1d gestation who is being admitted for . Her current obstetrical history is significant for CHTN, AMA, and obesity. She is here for scheduled . She has received IV steroids w/ admission to L&D last week. She was seen in clinic yesterday w/ elevated /97. She currently denies any HA, SOB, CP, RUQ abdominal pain, or lower extremity edema. She is on procardia 30 mg daily for CHTN.     PMHx:   Past Medical History:   Diagnosis Date    Hypertension      PSHx:   Past Surgical History:   Procedure Laterality Date     SECTION        Meds:   No current facility-administered medications on file prior to encounter.     Current Outpatient Medications on File Prior to Encounter   Medication Sig Dispense Refill    acyclovir (ZOVIRAX) 800 MG Tab Take 1 tablet (800 mg total) by mouth 5 (five) times daily. 40 tablet 0    aspirin (ECOTRIN) 81 MG EC tablet Take 81 mg by mouth once daily.      fluticasone (FLONASE) 50 mcg/actuation nasal spray 2 sprays (100 mcg total) by Each Nare route 2 (two) times daily as needed. 15 g 0    NIFEdipine (PROCARDIA-XL) 30 MG (OSM) 24 hr tablet Take 1 tablet (30 mg total) by mouth once daily. 30 tablet 11          Objective:       BP (!) 145/95 (BP Location: Right arm, Patient Position: Lying)   Pulse (!) 115   Temp 98 °F (36.7 °C) (Oral)   LMP 2023   SpO2 100%   Breastfeeding No     General:   alert, appears stated age, and cooperative   Lungs:   clear to auscultation bilaterally   Heart:   regular rate and rhythm, S1, S2 normal, no murmur, click, rub or gallop   Abdomen:  soft, non-tender; bowel sounds normal; no masses,  no organomegaly   Pelvis:  Exam deferred.   Extremities: extremities normal, atraumatic, no cyanosis or edema          Lab Review  A, Rh+    Recent Results (from the past 4 hour(s))   CBC auto differential    Collection Time: 24  8:38 AM   Result Value Ref Range    WBC  11.11 3.90 - 12.70 K/uL    RBC 3.97 (L) 4.00 - 5.40 M/uL    Hemoglobin 11.3 (L) 12.0 - 16.0 g/dL    Hematocrit 34.3 (L) 37.0 - 48.5 %    MCV 86 82 - 98 fL    MCH 28.5 27.0 - 31.0 pg    MCHC 32.9 32.0 - 36.0 g/dL    RDW 13.2 11.5 - 14.5 %    Platelets 214 150 - 450 K/uL    MPV 11.4 9.2 - 12.9 fL    Immature Granulocytes CANCELED 0.0 - 0.5 %    Immature Grans (Abs) CANCELED 0.00 - 0.04 K/uL    Lymph # CANCELED 1.0 - 4.8 K/uL    Mono # CANCELED 0.3 - 1.0 K/uL    Eos # CANCELED 0.0 - 0.5 K/uL    Baso # CANCELED 0.00 - 0.20 K/uL    nRBC 0 0 /100 WBC    Gran % 78.0 (H) 38.0 - 73.0 %    Lymph % 16.0 (L) 18.0 - 48.0 %    Mono % 5.0 4.0 - 15.0 %    Eosinophil % 1.0 0.0 - 8.0 %    Basophil % 0.0 0.0 - 1.9 %    Platelet Estimate Appears normal     Differential Method Manual    Type & Screen, Labor & Delivery    Collection Time: 24  8:38 AM   Result Value Ref Range    Group & Rh A POS     Indirect Toro NEG           Assessment/Plan:     34w1d weeks gestation panned for  today  CHTN  AMA  Obesity    Patient Active Problem List   Diagnosis    BMI 40.0-44.9, adult    Hypertension during pregnancy in second trimester    Fetal arrhythmia affecting pregnancy, antepartum    Chronic hypertension complicating or reason for care during pregnancy, third trimester    Hypertension affecting pregnancy in third trimester     Plan for scheduled  today at 10am  Continue nifedipine 30 mg daily. Continue monitoring frequent vitals signs    Patient seen and plan discussed w/ attending physician Dr Timbo Germain, DO  [facility], PGY-3  2024

## 2024-01-30 NOTE — ANESTHESIA POSTPROCEDURE EVALUATION
Anesthesia Post Evaluation    Patient: Joseph uDenas    Procedure(s) Performed: Procedure(s) (LRB):   SECTION, WITH TUBAL LIGATION (N/A)    Final Anesthesia Type: spinal      Patient location during evaluation: labor & delivery  Patient participation: Yes- Able to Participate  Level of consciousness: awake and alert and oriented  Post-procedure vital signs: reviewed and stable  Pain management: adequate  Airway patency: patent  KUSHAL mitigation strategies: Use of major conduction anesthesia (spinal/epidural) or peripheral nerve block  PONV status at discharge: No PONV  Anesthetic complications: no      Cardiovascular status: hemodynamically stable  Respiratory status: spontaneous ventilation and room air  Hydration status: euvolemic  Follow-up not needed.              Vitals Value Taken Time   /65 24 0940   Temp 36.7 °C (98 °F) 24 0800   Pulse 106 24 1004   Resp 20 24 0923   SpO2 100 % 24 1003   Vitals shown include unvalidated device data.      No case tracking events are documented in the log.      Pain/[place] Score: No data recorded

## 2024-01-30 NOTE — ANESTHESIA PROCEDURE NOTES
Spinal    Diagnosis: Repeat C/S  Patient location during procedure: OR  Start time: 1/30/2024 10:16 AM  Timeout: 1/30/2024 10:16 AM  End time: 1/30/2024 10:19 AM    Staffing  Authorizing Provider: Loi Rodríguez MD  Performing Provider: Willis Albert CRNA    Staffing  Performed by: Willis Albert CRNA  Authorized by: Loi Rodríguez MD    Preanesthetic Checklist  Completed: patient identified, IV checked, site marked, risks and benefits discussed, surgical consent, monitors and equipment checked, pre-op evaluation and timeout performed  Spinal Block  Patient position: sitting  Prep: ChloraPrep  Patient monitoring: heart rate, cardiac monitor, continuous pulse ox and frequent blood pressure checks  Approach: midline  Location: L4-5  Injection technique: single shot  CSF Fluid: clear free-flowing CSF  Needle  Needle type: pencil-tip   Needle gauge: 25 G  Needle length: 5 in  Additional Documentation: incremental injection, negative aspiration for heme and no paresthesia on injection  Needle localization: anatomical landmarks  Assessment  Sensory level: T6   Dermatomal levels determined by pinch or prick  Ease of block: easy  Patient's tolerance of the procedure: comfortable throughout block and no complaints

## 2024-01-30 NOTE — TRANSFER OF CARE
Anesthesia Transfer of Care Note    Patient: Joseph Duenas    Procedure(s) Performed: Procedure(s) (LRB):   SECTION, WITH TUBAL LIGATION (N/A)    Patient location: Labor and Delivery    Anesthesia Type: spinal    Transport from OR: Transported from OR on room air with adequate spontaneous ventilation    Post pain: adequate analgesia    Post assessment: no apparent anesthetic complications and tolerated procedure well    Post vital signs: stable    Level of consciousness: awake, alert and oriented    Nausea/Vomiting: no nausea/vomiting    Complications: none    Transfer of care protocol was followed      Last vitals: Visit Vitals  BP (!) 145/95 (BP Location: Right arm, Patient Position: Lying)   Pulse (!) 115   Temp 36.7 °C (98 °F) (Oral)   Resp 20   LMP 2023   SpO2 100%   Breastfeeding No

## 2024-01-31 LAB
BASOPHILS # BLD AUTO: 0.01 K/UL (ref 0–0.2)
BASOPHILS NFR BLD: 0.1 % (ref 0–1.9)
DIFFERENTIAL METHOD BLD: ABNORMAL
EOSINOPHIL # BLD AUTO: 0.1 K/UL (ref 0–0.5)
EOSINOPHIL NFR BLD: 0.4 % (ref 0–8)
ERYTHROCYTE [DISTWIDTH] IN BLOOD BY AUTOMATED COUNT: 13.2 % (ref 11.5–14.5)
HCT VFR BLD AUTO: 32 % (ref 37–48.5)
HGB BLD-MCNC: 10.5 G/DL (ref 12–16)
IMM GRANULOCYTES # BLD AUTO: 0.06 K/UL (ref 0–0.04)
IMM GRANULOCYTES NFR BLD AUTO: 0.5 % (ref 0–0.5)
LYMPHOCYTES # BLD AUTO: 1.5 K/UL (ref 1–4.8)
LYMPHOCYTES NFR BLD: 11.5 % (ref 18–48)
MCH RBC QN AUTO: 28.5 PG (ref 27–31)
MCHC RBC AUTO-ENTMCNC: 32.8 G/DL (ref 32–36)
MCV RBC AUTO: 87 FL (ref 82–98)
MONOCYTES # BLD AUTO: 1.1 K/UL (ref 0.3–1)
MONOCYTES NFR BLD: 8.1 % (ref 4–15)
NEUTROPHILS # BLD AUTO: 10.4 K/UL (ref 1.8–7.7)
NEUTROPHILS NFR BLD: 79.4 % (ref 38–73)
NRBC BLD-RTO: 0 /100 WBC
PLATELET # BLD AUTO: 185 K/UL (ref 150–450)
PMV BLD AUTO: 11.9 FL (ref 9.2–12.9)
RBC # BLD AUTO: 3.69 M/UL (ref 4–5.4)
RPR SER QL: NORMAL
WBC # BLD AUTO: 13.13 K/UL (ref 3.9–12.7)

## 2024-01-31 PROCEDURE — 36415 COLL VENOUS BLD VENIPUNCTURE: CPT | Performed by: OBSTETRICS & GYNECOLOGY

## 2024-01-31 PROCEDURE — 25000003 PHARM REV CODE 250: Performed by: OBSTETRICS & GYNECOLOGY

## 2024-01-31 PROCEDURE — 11000001 HC ACUTE MED/SURG PRIVATE ROOM

## 2024-01-31 PROCEDURE — 99231 SBSQ HOSP IP/OBS SF/LOW 25: CPT | Mod: ,,, | Performed by: OBSTETRICS & GYNECOLOGY

## 2024-01-31 PROCEDURE — 85025 COMPLETE CBC W/AUTO DIFF WBC: CPT | Performed by: OBSTETRICS & GYNECOLOGY

## 2024-01-31 PROCEDURE — 25000003 PHARM REV CODE 250: Performed by: ANESTHESIOLOGY

## 2024-01-31 RX ADMIN — IBUPROFEN 800 MG: 400 TABLET ORAL at 02:01

## 2024-01-31 RX ADMIN — NIFEDIPINE 30 MG: 30 TABLET, FILM COATED, EXTENDED RELEASE ORAL at 08:01

## 2024-01-31 RX ADMIN — IBUPROFEN 800 MG: 400 TABLET ORAL at 09:01

## 2024-01-31 RX ADMIN — MUPIROCIN: 20 OINTMENT TOPICAL at 09:01

## 2024-01-31 RX ADMIN — IBUPROFEN 800 MG: 400 TABLET ORAL at 08:01

## 2024-01-31 RX ADMIN — OXYCODONE AND ACETAMINOPHEN 1 TABLET: 10; 325 TABLET ORAL at 09:01

## 2024-01-31 RX ADMIN — ACETAMINOPHEN 650 MG: 325 TABLET ORAL at 05:01

## 2024-01-31 RX ADMIN — OXYCODONE HYDROCHLORIDE 10 MG: 5 TABLET ORAL at 03:01

## 2024-01-31 RX ADMIN — OXYCODONE AND ACETAMINOPHEN 1 TABLET: 10; 325 TABLET ORAL at 02:01

## 2024-01-31 RX ADMIN — NIFEDIPINE 30 MG: 30 TABLET, FILM COATED, EXTENDED RELEASE ORAL at 09:01

## 2024-01-31 NOTE — PLAN OF CARE
Elevated BP now WNL. Tolerating regular diet.  Reports pain relieved with ordered pain meds administered.   Infant in NICU and offered to set up on breast pump.  Pt declined.

## 2024-01-31 NOTE — NURSING
1525pm=  notified Dr. Izquierdo of elevated BP of 143/101 and 147/105.  New  orders received .  Notified MD that pt took home med of Procardia this am.

## 2024-01-31 NOTE — NURSING
1445pm=  admitted to unit via bed, resp even and unlabored.  IV Rt hand infusing LR at 125ml/hr, via pump.  Ayan patent.  Safety maintained.  Call light in reach.

## 2024-01-31 NOTE — NURSING
Patient given written information on the use of the electric pump. Breast pump setup at patients bedside. Pt educated and return teach back demonstrated by patient of setup and breakdown of breastpump. Pt encouraged to try to latch infant first then use breast pump every 2-3 hours.  Pt stated verbal understanding. Labels printed and given to mother. All questions addressed and answered.

## 2024-01-31 NOTE — PLAN OF CARE
VSS. NAD noted. Pt reports pain goal met with prescribed medications per MD.  Ambulating freely in room.  Tolerating regular diet. Family support noted at bedside.  Remains free from falls and injury. POC reviewed with patient. Pt verbalized understanding.

## 2024-01-31 NOTE — PROGRESS NOTES
POD #1 s/p  at 34w1d gestation  CHTN  AMA  Obesity    Subjective: No complaints. Doing well. Voiding w/o difficultly. Pain currently 4/10 in severity.     Objective: /86   Pulse 99   Temp 98 °F (36.7 °C) (Oral)   Resp 18   LMP 2023   SpO2 100%   Breastfeeding Unknown     I/O last 3 completed shifts:  In: 1289.2 [I.V.:189.2; IV Piggyback:1100]  Out: 6696 [Urine:6000; Blood:696]  No intake/output data recorded.      H/H:   Lab Results   Component Value Date    WBC 13.13 (H) 2024    HGB 10.5 (L) 2024    HCT 32.0 (L) 2024    MCV 87 2024     2024       Chest: Clear to auscultation  CV: Regular rate and rhythm  Abdomen: Non-tender, non-distended, soft, positive bowel sounds  Incision: Clean, dry, intact  Extremities: Non-tender, no edema    Assessment:POD #1 S/P     Plan: Routine progressive care     Continue neifedipine 30 mg daily. /86 this morning  Baby admitted to NICU  Plan for discharge tomorrow morning    Patient seen and plan discussed w/ attending physician Dr. Timbo Germain, DO  [facility], PGY-3  2024

## 2024-01-31 NOTE — PLAN OF CARE
Note copied from Infant's chart (MRN [id number])    SOCIAL WORK DISCHARGE PLANNING ASSESSMENT     SW completed discharge planning assessment with pt's mother in mother's room K302. Pt's mother was easily engaged and education on the role of  was provided. Pt's mother reported all necessities for patient were obtained, including a car seat. Pt's mother reported she has good support from family and friends and advised pt's maternal grandmother Aj will provide assistance as needed after returning home. Pt's father will provide transportation home following discharge. Pt's mother was provided with a NICU resource packet that included education on how to obtain a breast pump through [facility] and education on how to enroll with WIC. No other needs for community resources were reported. Pt's mother was encouraged to call with any questions or concerns. Pt's mother verbalized understanding.      Legal Name: Mariela Diana Airline            :  2023  Address: [address] [place] [address]  Parent's Phone Numbers: pt's mother Joseph Preisbock [phone] and pt's father Sarah Airline [phone]     Pediatrician:  Dr. Garcia     Education: Information given on NICU Education Classes; Physician/NNP daily rounds; and Postpartum Depression signs.   Potential Eligibility for SSI Benefits: No            Patient Active Problem List   Diagnosis    Prematurity, 2,000-2,499 grams, 33-34 completed weeks    Slow transition to extrauterine life    Need for observation and evaluation of  for sepsis    Hyperbilirubinemia of prematurity    Alteration in nutrition in infant    Anemia of  prematurity            Birth Hospital:[facility]                Birth Weight:   2.391 kg (5 lb 4.3 oz)              Birth Length: 41cm                  Gestational Age: 34w1d           Apgars    Living status: Living  Apgar Component Scores:  1 min.:  5 min.:  10 min.:  15 min.:  20 min.:     Skin color:  0  1  1        Heart rate:  1  2  2        Reflex irritability:  0  1  2        Muscle tone:  0  1  2        Respiratory effort:  0  2  2        Total:  1  7  9        Apgars assigned by: JOSIAH CARTER           24 1320   NICU Assessment   Assessment Type Discharge Planning Assessment   Source of Information family   Verified Demographic and Insurance Information Yes   Insurance Medicaid   Medicaid Healthy Blue   Spiritual Affiliation [employer]   Pastoral Care/Clergy/ Contact Status none needed   Lives With mother;father   Relationship Status of Parents In relationship   Father's Involvement Fully Involved   Is Father signing the birth certificate Yes   Father Name and  [id number] [id number]   Family Involvement High   Primary Contact Name and Number pt's mother Joseph Chowgs [phone] and pt's father Sarah Airline [phone]   Other Contacts Names and Numbers pt's maternal grandmother Aj Machado [phone]   Infant Feeding Plan breastfeeding;formula feeding   Breast Pump Needed yes   Does baby have crib or safe sleep space? Yes   Do you have a car seat? Yes   Resources/Education Provided [facility];Healthy [place] Insurance plan;SSI Benefits;Preparing for Your Baby's Discharge Home;Support Resources for NICU Families;Insurance Coverage of Breast Pumps and Supplies;Breast Pumps through [employer] [place] insurance plan;WIC;Early Intervention Program;Post Partum Depression;My Preemi Skylar;My NICU Baby Skylar   DCFS No indications (Indicators for Report)   Discharge Plan A Home with family

## 2024-02-01 PROCEDURE — 25000003 PHARM REV CODE 250: Performed by: OBSTETRICS & GYNECOLOGY

## 2024-02-01 PROCEDURE — 11000001 HC ACUTE MED/SURG PRIVATE ROOM

## 2024-02-01 PROCEDURE — 99231 SBSQ HOSP IP/OBS SF/LOW 25: CPT | Mod: ,,, | Performed by: OBSTETRICS & GYNECOLOGY

## 2024-02-01 RX ADMIN — IBUPROFEN 800 MG: 400 TABLET ORAL at 02:02

## 2024-02-01 RX ADMIN — SIMETHICONE 80 MG: 80 TABLET, CHEWABLE ORAL at 06:02

## 2024-02-01 RX ADMIN — OXYCODONE AND ACETAMINOPHEN 1 TABLET: 10; 325 TABLET ORAL at 08:02

## 2024-02-01 RX ADMIN — MUPIROCIN: 20 OINTMENT TOPICAL at 08:02

## 2024-02-01 RX ADMIN — MAGNESIUM HYDROXIDE 800 MG: 400 SUSPENSION ORAL at 08:02

## 2024-02-01 RX ADMIN — NIFEDIPINE 30 MG: 30 TABLET, FILM COATED, EXTENDED RELEASE ORAL at 08:02

## 2024-02-01 RX ADMIN — OXYCODONE AND ACETAMINOPHEN 1 TABLET: 10; 325 TABLET ORAL at 03:02

## 2024-02-01 RX ADMIN — OXYCODONE AND ACETAMINOPHEN 1 TABLET: 10; 325 TABLET ORAL at 01:02

## 2024-02-01 RX ADMIN — IBUPROFEN 800 MG: 400 TABLET ORAL at 08:02

## 2024-02-01 NOTE — DISCHARGE INSTRUCTIONS
Pumping for the Baby in NICU    Preparation and Hygiene:  Shower daily.  Wear a clean bra each day and wash daily in warm soapy water.  Change wet or moist breast pads frequently.  Moist pads can promote growth of germs.  Actively wash your hands, paying close attention to the area around and under your fingernails, thoroughly with soap and water for 15 seconds before pumping or handling your milk.  Re-wash your hands if you touch anything (scratching your nose, answering the phone, etc) while pumping or handling your milk.   Before pumping your breasts, assemble the pump collection kit and have ready the sterile container and labels.  Place these items on a clean surface next to the breast pump.  Each time after you have finished pumping, take apart all of the parts of the breast pump collection kit and place them in a separate cleaning container (do not place them in the sink).  Be sure to remove the yellow valve from the breast shield and separate the white membrane from the yellow valve.  Rinse all of these parts with cool water.  Then use a new sponge and/or bottle brush and dishwashing detergent to clean the parts.  Rinse off the soapy water with cool water and air dry on a clean towel covered with a clean cloth.  All parts may also be washed after each use in the top rack of a .  Once each day, sanitize all of the parts of the breast pump collection kit.  This can be done by boiling the kit parts for 10 minutes or by using a Quick Clean Micro-Steam Bag made by [employer]  If condensation appears in the tubing, continue to run the pump with the tubing attached for 1-2 minutes or until the tubing is dry.   Notify your babys nurse or doctor if you become ill or need to take any medication, even over-the-counter medicines.    Collection and Storage of Expressed Breast milk:       1. Pump your breasts at least 8-10 times every 24 hours.  Double pump (both breasts at the same time) for at least 15-20  minutes using the most suction that is comfortable.    2. Write the date and time of pumping and the name of any medications you are taking on the babys pre-printed hospital identification label.   3. Place your babys pre-printed hospital identification label on each container of breast milk.  Additional pre-printed labels can be obtained from your babys nurse.  If your expressed breast milk is not correctly labeled, the nurse cannot feed the milk to the baby.       4.    Do not touch the inside of the storage containers or lids.  5.        Pour the amount of expressed milk needed for 1 of your babys feedings into each storage container.  Use a new container(s) for each pumping.  Additional storage containers can be obtained from your babys nurse.  6. Tightly screw the lid onto the container and place immediately into the refrigerator for daily transportation to the hospital.   Do not freeze your milk unless asked to do so by your babys nurse.  However, if you are not able to visit your baby each day, place the expressed breast milk in the freezer.  7.     Expressed breast milk should be refrigerated or frozen within 4 hours of pumping.  8.         Do not store expressed breast milk on the door of your refrigerator or freezer where the temperature is warmer.     Transportation of Expressed Breast milk:  Refrigerated breast milk or frozen milk should be packed tightly together in a cooler with frozen, gel-packs to keep the milk frozen.  DO NOT USE ICE CUBES (WET ICE) TO TRANSPORT FROZEN MILK.   A clean towel can be used to fill any extra space between containers of frozen milk.  2.    Bring your expressed milk from home each time you visit the baby.       Patient Discharge Instructions for Postpartum Women    Resume Regular Diet  Increase activity gradually, no heavy lifting  Shower  No tampons, douching or sexual intercourse.  Discuss birth control options with your physician.  Wear a support bra  Return to  "work/school when you've been cleared by a physician    Call your physician if     *Fever of 100.4 or higher  *Persistent nausea/ vomiting  *Incisional drainage  *Heavy vaginal bleeding or large clots (Heavy bleeding is soaking 1 pad in an hour)  *Swelling and pain in arms or legs  *Severe headaches, blurred vision or fainting  *Shortness of breath  *Frequency and burning with urination  *Signs of postpartum depression, discuss these signs with your physician    Call lactation services for questions regarding feeding, nipple and breast care, and general questions about lactation.  They can be reached at [phone]         Understanding Postpartum Depression    You've just had a baby.  You know you should be excited and happy.  But instead you find yourself crying for no reason.  You may have trouble coping with your daily tasks.  You feel sad, tired, and hopeless most of the time.  You may even feel ashamed or guilty.  But what you're going through is not your fault and you can feel better.  Talk to your doctor.  He or she can help.    Depression After Childbirth    You may be weepy and tired right after giving birth.  These feelings are normal.  They're sometimes called the "baby blues."  These blues go away 2-3 weeks.  However, postpartum (meaning "after birth") depression lasts much longer and is more sever than the "baby blues."  It can make you feel sad and hopeless.  You may also fear that your baby will be harmed and worry about being a bad mother.      What is Depression?    Depression is a mood disorder that affects the way you think and feel.  The most common symptom is a feeling of deep sadness.  You may also feel as if you just can't cope with life.    Other symptoms include:      * Gaining or loosing weight  * Sleeping too much or too little  * Feeling tired all the time  * Feeling restless  * Fears of harming your baby   * Lack of interest in your baby  * Feeling worthless or guilty  * No longer " finding pleasure in things you used to  * Having trouble thinking clearly or making decisions  * Thoughts of hurting yourself or your baby    What Causes Postpartum Depression    The exact causes of postpartum depression isn't known.  It may be due to changes in your hormones during and after childbirth.  You may also be tired from caring for your baby and adjusting to being a mother.  All these factors may make you feel depressed.  In some cases, your genes may also play a role.    Depression Can Be Treated    The good news is that there are many ways to treat postpartum depression.  Talking to your doctor is the first step toward feeling better.    Resources:    * National [place] of Mental Health  -- 395-913-9341    www.nimh.nih.gov    * National [place] on Mental Illness --672.959.7010    Www.marcial.org    * Mental Health [place] -- 880.670.2824     Www.nmha.org    * National Suicide Hotline --466.237.7976 (800-SUICIDE)    7016-5364 [employer]  All rights reserved.  This information is not intended as a substitute for professional medical care.  Always follow up with your healthcare professional's instructions.

## 2024-02-01 NOTE — PROGRESS NOTES
2024      Patient is doing well with no complaints. Tolerating Po without N/V. Passing flatus. Ambulated without difficulty. Pain controlled with pain medications. Lochia is less than menses. Is bottlefeeding and pumping for shanice in NICU. had BTL.     Temp:  [97.8 °F (36.6 °C)-98.2 °F (36.8 °C)] 98 °F (36.7 °C)  Pulse:  [90-92] 91  Resp:  [18] 18  SpO2:  [96 %-100 %] 96 %  BP: (119-132)/(78-89) 122/79      In bed, NAD,  abd S/NT/ND + BS FF and below umbilicus, dressing c/d/I ext: trace edema, notenderness     A/P: 38 y.o.   s/p RLTCD/BTL PPD 2       1. Continue PP routine care, likely d/c tomorrow   2. HNT: BP stable on procardia 30 xl, no pre-e symptoms   3. Obesity: SCDs when in bed  4. Anemia: start iron when bowel function improves

## 2024-02-01 NOTE — LACTATION NOTE
Rounded on mom. Mom reported that she is not getting much when pumping. Reassured her that this is normal. Encouraged her to continue to pump every 3 hours if possible to provide breast stimulation. Mom given praise and support for her decision to pump for baby. Encouraged mom to use hand expression to collect colostrum as well. Encouraged hand expression and warm compresses to breasts prior to pumping to maximize milk flow. Offered to assist mom with hand expression at this time but mom declined. Encouraged mom to call this RN if further pumping assistance is needed. Mom verbalized understanding.      [facility]  Lactation Note - Mom    SUMMARY     Maternal Assessment    Breast Density: Bilateral:, soft  Left Nipple Symptoms:  (denies pain)  Right Nipple Symptoms:  (denies pain)      LATCH Score         Breasts WDL    Breast WDL: WDL  Left Nipple Symptoms:  (denies pain)  Right Nipple Symptoms:  (denies pain)    Maternal Infant Feeding    Maternal Preparation: breast care, hand hygiene  Maternal Emotional State: independent, relaxed  Pain with Feeding: no (w pumping)  Comfort Measures Following Feeding: air-drying encouraged    Lactation Referrals    Community Referrals: home health care  Home Health Care Lactation Follow-up Date/Time: THS given    Lactation Interventions               Breastfeeding Session    Breast Pumping Interventions: early pumping promoted, frequent pumping encouraged    Maternal Information

## 2024-02-02 LAB
FINAL PATHOLOGIC DIAGNOSIS: NORMAL
GROSS: NORMAL
Lab: NORMAL

## 2024-02-02 PROCEDURE — 99231 SBSQ HOSP IP/OBS SF/LOW 25: CPT | Mod: ,,, | Performed by: OBSTETRICS & GYNECOLOGY

## 2024-02-02 PROCEDURE — 11000001 HC ACUTE MED/SURG PRIVATE ROOM

## 2024-02-02 PROCEDURE — 25000003 PHARM REV CODE 250: Performed by: OBSTETRICS & GYNECOLOGY

## 2024-02-02 RX ORDER — IBUPROFEN 800 MG/1
800 TABLET ORAL EVERY 8 HOURS PRN
Qty: 30 TABLET | Refills: 0 | Status: SHIPPED | OUTPATIENT
Start: 2024-02-02

## 2024-02-02 RX ORDER — HYDROCODONE BITARTRATE AND ACETAMINOPHEN 5; 325 MG/1; MG/1
1 TABLET ORAL EVERY 6 HOURS PRN
Qty: 12 TABLET | Refills: 0 | Status: SHIPPED | OUTPATIENT
Start: 2024-02-02

## 2024-02-02 RX ADMIN — OXYCODONE AND ACETAMINOPHEN 1 TABLET: 10; 325 TABLET ORAL at 08:02

## 2024-02-02 RX ADMIN — MUPIROCIN: 20 OINTMENT TOPICAL at 08:02

## 2024-02-02 RX ADMIN — IBUPROFEN 800 MG: 400 TABLET ORAL at 08:02

## 2024-02-02 RX ADMIN — NIFEDIPINE 30 MG: 30 TABLET, FILM COATED, EXTENDED RELEASE ORAL at 08:02

## 2024-02-02 RX ADMIN — IBUPROFEN 800 MG: 400 TABLET ORAL at 03:02

## 2024-02-02 NOTE — PLAN OF CARE
VSS, NAD. Positive bonding noted between patient and . Patient spent majority of the day in the NICU visiting infant. Patient's reports pain is controlled with scheduled medications. Patient is up to date on plan of care and verbalizes understanding.

## 2024-02-02 NOTE — PLAN OF CARE
Patient tolerating regular diet. Ambulating and voiding without difficulty. Passing flatus. Rubra light throughout the night. LTV incision remains CDI with aquacel. Pain well controlled with ordered pain meds. VSS. Pt continues BP med as ordered. No distress noted. Visits and holds infant in NICU. Positive bonding noted. Significant other supportive. Plans to be discharged home today.

## 2024-02-02 NOTE — LACTATION NOTE
Rounded on couplet while mom visiting baby in NICU. Mom reports that she still is not getting much milk while pumping. Reports she last pumped yesterday. Encouraged pumping 8/+ in 24 to maintain supply. Reviewed importance of moist heat to breasts, breast massage, and hand expression prior to pumping.  Encouraged mom to  pump from [employer] today. Discussed pump options for home again. Encouraged mom to bring clean pump parts to hospital any time she visits baby to use Symphony pump.  Mom was educated about milk storage and transport. Given milk labels and collection bottles today as well.  Mom given lact. Center phone number for future reference.  Mom verbalized understanding.    [facility]  Lactation Note - Mom    SUMMARY     Maternal Assessment    Breast Density: Bilateral:, filling  Left Nipple Symptoms:  (denies pain)  Right Nipple Symptoms:  (denies pain)  Preferred Pain Scale: number (Numeric Rating Pain Scale)  Comfort/Acceptable Pain Level: 5  Pain Body Location - Side: Bilateral  Pain Body Location - Orientation: incisional  Pain Body Location: abdomen  Pain Rating (0-10): Rest: 0  Pain Rating (0-10): Activity: 0  Frequency: intermittent  Quality: aching  Pain Management Interventions: medication offered, medication offered but refused  Sleep/Rest/Relaxation: awake, sleeping between care  POSS ([place] Opioid-Induced Sed Scale): 1 - Awake and alert    LATCH Score         Breasts WDL    Breast WDL: WDL  Left Nipple Symptoms:  (denies pain)  Right Nipple Symptoms:  (denies pain)    Maternal Infant Feeding    Maternal Preparation: breast care, hand hygiene  Maternal Emotional State: independent, relaxed  Pain with Feeding: no (w pumping)  Comfort Measures Following Feeding: air-drying encouraged    Lactation Referrals    Community Referrals: outpatient lactation program, support group  Home Health Care Lactation Follow-up Date/Time: THS given  Outpatient Lactation Program  Lactation Follow-up Date/Time: call lact ctr prn  Support Group Lactation Follow-up Date/Time: community resources given in bf guide    Lactation Interventions               Breastfeeding Session    Breast Pumping Interventions: early pumping promoted, frequent pumping encouraged    Maternal Information

## 2024-02-03 VITALS
TEMPERATURE: 98 F | SYSTOLIC BLOOD PRESSURE: 145 MMHG | RESPIRATION RATE: 18 BRPM | HEART RATE: 107 BPM | OXYGEN SATURATION: 98 % | DIASTOLIC BLOOD PRESSURE: 90 MMHG

## 2024-02-03 PROCEDURE — 99238 HOSP IP/OBS DSCHRG MGMT 30/<: CPT | Mod: ,,, | Performed by: STUDENT IN AN ORGANIZED HEALTH CARE EDUCATION/TRAINING PROGRAM

## 2024-02-03 PROCEDURE — 25000003 PHARM REV CODE 250: Performed by: OBSTETRICS & GYNECOLOGY

## 2024-02-03 RX ADMIN — MUPIROCIN: 20 OINTMENT TOPICAL at 09:02

## 2024-02-03 RX ADMIN — NIFEDIPINE 30 MG: 30 TABLET, FILM COATED, EXTENDED RELEASE ORAL at 09:02

## 2024-02-03 RX ADMIN — SIMETHICONE 80 MG: 80 TABLET, CHEWABLE ORAL at 05:02

## 2024-02-03 RX ADMIN — IBUPROFEN 800 MG: 400 TABLET ORAL at 09:02

## 2024-02-03 NOTE — PLAN OF CARE
Pt tolerating regular diet, voiding, passing gas, vss, bps on my shift 152/102, 143/86, 141/96, nad.  Pain managed well with ordered medication.

## 2024-02-03 NOTE — PLAN OF CARE
VSS, NAD, and tolerating regular diet. Ambulating without limitation; voiding without difficulty. Pain well controlled with pain medications. Infant in the NICU; pt pumping. Visiting infant in the NICU often. 0700 BP was 149/95 and gave scheduled Procardia @ 0900. BP was 145/90 when retaken at 1100. Instructed pt to return to unit in 3 days for a BP re-check. Questions encouraged and answered. Pt stable and ready for discharge. Discharge teaching given to pt and SO. Pt left via wheelchair with SO.

## 2024-02-03 NOTE — LACTATION NOTE
Rounded on pt. Discussed benefits of EBM & importance of pumping for  nicu baby. Mom has been pumping but only couple of times yesterday & not at all so far today. Discussed importance of pumping frequently to establish adequate milk production; supply/demand. Symphony pump at . Reviewed use & cleaning of pump & kit. Stressed importance of hand hygiene & keeping pump kit clean. Will collect, label, store & transport EBM as instructed. Denies need for assistance with pumping at this time. Encouraged hand expression after pumping for 15 minutes. Mom able to express small drops of colostrum. Praise, encouragement & reassurance provided. Discussed supply/demand; normal to get only drops in the first couple of days. Mom will begin to pump/hand express at least 8+ times/24 hrs for  nicu baby. Questions answered. Encouraged to call for any needs. Verbalized understanding.

## 2024-02-03 NOTE — DISCHARGE SUMMARY
Delivery Discharge Summary  Obstetrics      Primary OB Clinician: Davis Izquierdo MD      Admission date: 2024  Discharge date: 2024    Disposition: To home, self care    Discharge Diagnosis List:      Patient Active Problem List   Diagnosis    BMI 40.0-44.9, adult    Hypertension during pregnancy in second trimester    Fetal arrhythmia affecting pregnancy, antepartum    Chronic hypertension complicating or reason for care during pregnancy, third trimester    Hypertension affecting pregnancy in third trimester     delivery delivered       Procedure: , due to history of prior  delivery an diagnosis of PreE with severe features    Hospital Course:  Joseph Duenas is a 38 y.o. now , POD #4 who was admitted on 2024 at 34w1d for RLTCS/BTL. Patient was subsequently admitted and underwent a repeat  delivery with BTL. Please see delivery note for further details. Her postpartum course was uncomplicated. On discharge day, patient's pain is controlled with oral pain medications. Pt is tolerating ambulation without SOB or CP, and regular diet without N/V. Reports lochia is mild. Denies any HA, vision changes, F/C, LE swelling. Denies any breast pain/soreness.    Pt in stable condition and ready for discharge. She has been instructed to start and/or continue medications and follow up with her obstetrics provider as listed below.    Pertinent studies:  CBC  Recent Labs   Lab 24  0838 24  0451   WBC 11.11 13.13*   HGB 11.3* 10.5*   HCT 34.3* 32.0*   MCV 86 87    185          There is no immunization history for the selected administration types on file for this patient.     Delivery:    Episiotomy:     Lacerations:     Repair suture:     Repair # of packets:     Blood loss (ml):       Birth information:  YOB: 2023   Time of birth: 10:56 AM   Sex: female   Delivery type: , Low Vertical   Gestational Age: 34w1d     Measurements     Weight: 2391 g  Weight (lbs): 5 lb 4.3 oz  Length:          Delivery Clinician: Delivery Providers    Delivering clinician: Davis Izquierdo MD   Provider Role    Magnolia Mendoza, RN Registered Nurse    Kasey, Gilbert, ST Surgical Tech    Riccardo Wilson First Assist    Niki Archer NNP Nurse Practitioner    Willis Albert, CRNA Nurse Anesthetist    Loi Rodríguez MD Anesthesiologist    Veronica Estrada, RN Registered Nurse             Additional  information:  Forceps:    Vacuum:    Breech:    Observed anomalies      Living?:     Apgars    Living status: Living  Apgar Component Scores:  1 min.:  5 min.:  10 min.:  15 min.:  20 min.:    Skin color:  0  1  1      Heart rate:  1  2  2      Reflex irritability:  0  1  2      Muscle tone:  0  1  2      Respiratory effort:  0  2  2      Total:  1  7  9      Apgars assigned by: JOSIAH CARTER         Placenta: Delivered:       appearance    Patient Instructions:   Current Discharge Medication List        START taking these medications    Details   HYDROcodone-acetaminophen (NORCO) 5-325 mg per tablet Take 1 tablet by mouth every 6 (six) hours as needed for Pain.  Qty: 12 tablet, Refills: 0    Comments: Quantity prescribed more than 7 day supply? No      ibuprofen (ADVIL,MOTRIN) 800 MG tablet Take 1 tablet (800 mg total) by mouth every 8 (eight) hours as needed for Pain.  Qty: 30 tablet, Refills: 0           CONTINUE these medications which have NOT CHANGED    Details   acyclovir (ZOVIRAX) 800 MG Tab Take 1 tablet (800 mg total) by mouth 5 (five) times daily.  Qty: 40 tablet, Refills: 0      fluticasone (FLONASE) 50 mcg/actuation nasal spray 2 sprays (100 mcg total) by Each Nare route 2 (two) times daily as needed.  Qty: 15 g, Refills: 0      NIFEdipine (PROCARDIA-XL) 30 MG (OSM) 24 hr tablet Take 1 tablet (30 mg total) by mouth once daily.  Qty: 30 tablet, Refills: 11    Comments: .           STOP taking these medications       aspirin  (ECOTRIN) 81 MG EC tablet Comments:   Reason for Stopping:               Discharge Procedure Orders   BREAST PUMP FOR HOME USE     Order Specific Question Answer Comments   Type of pump: Electric    Weight: 285 lb    Length of need (1-99 months): 99      Pelvic Rest   Order Comments: Pelvic Rest - Nothing in the Vagina for 6 weeks.     Notify your health care provider if you experience any of the following:   Order Comments: Vaginal Bleeding greater than a pad per hour.     Notify your health care provider if you experience any of the following:  increased confusion or weakness     Notify your health care provider if you experience any of the following:  persistent dizziness, light-headedness, or visual disturbances     Notify your health care provider if you experience any of the following:  worsening rash     Notify your health care provider if you experience any of the following:  severe persistent headache     Notify your health care provider if you experience any of the following:  difficulty breathing or increased cough     Notify your health care provider if you experience any of the following:  redness, tenderness, or signs of infection (pain, swelling, redness, odor or green/yellow discharge around incision site)     Notify your health care provider if you experience any of the following:  severe uncontrolled pain     Notify your health care provider if you experience any of the following:  persistent nausea and vomiting or diarrhea     Notify your health care provider if you experience any of the following:  temperature >100.4     Activity as tolerated        Follow-up Information       Eloisa Holman MD Follow up.    Specialty: Obstetrics and Gynecology  Why: BP CHECK  Contact information:  [address] [address]  [phone]                              Kaushik Del Valle M.D.  OB/GYN  [facility]

## 2024-02-03 NOTE — PROGRESS NOTES
POSTPARTUM PROGRESS NOTE     Joseph Duenas is a 38 y.o. female POD #4 status post Repeat  section with tubal at 34w1d in a pregnancy complicated by chronic hypertension with super-imposed pre-eclampsia.     Patient is doing well this morning. She denies nausea, vomiting, fever or chills.  Patient reports mild abdominal pain that is well relieved by oral pain medications. Lochia is mild. Patient is voiding without difficulty and ambulating with no difficulty. She has passed flatus.    Infant is in the NICU.     Objective:       Temp:  [97.9 °F (36.6 °C)-98.4 °F (36.9 °C)] 98.3 °F (36.8 °C)  Pulse:  [103-109] 108  Resp:  [18-20] 18  SpO2:  [100 %] 100 %  BP: (120-152)/() 141/96    General:   alert, appears stated age, and cooperative   Lungs:    Non-labored   Heart:   regular rate and rhythm   Abdomen:  soft, non-tender; bowel sounds normal; no masses,  no organomegaly   Uterus:  firm located at the umblicus.        Incision: Bandage in place, clean, dry and intact   Extremities: no pedal edema noted     Lab Review  No results found for this or any previous visit (from the past 4 hour(s)).    I/O  No intake or output data in the 24 hours ending 24 0909     Assessment:     Patient Active Problem List   Diagnosis    BMI 40.0-44.9, adult    Hypertension during pregnancy in second trimester    Fetal arrhythmia affecting pregnancy, antepartum    Chronic hypertension complicating or reason for care during pregnancy, third trimester    Hypertension affecting pregnancy in third trimester     delivery delivered        Plan:   1. Postpartum care:  - Patient doing well. Continue routine management and advances.  - Continue PO pain meds. Pain well controlled.    2. Chronic hypertension with superimposed pre-eclampsia  - BP in normal range  - will d/c home on Procardia 30XL    3. Chronic anemia  - continue oral iron    Dispo: Continue routine postpartum care. As patient meets milestones, will plan to  discharge today.    Kaushik Del Valle

## 2024-02-06 ENCOUNTER — HOSPITAL ENCOUNTER (OUTPATIENT)
Facility: HOSPITAL | Age: 39
Discharge: HOME | End: 2024-02-06
Attending: OBSTETRICS & GYNECOLOGY | Admitting: OBSTETRICS & GYNECOLOGY
Payer: MEDICAID

## 2024-02-06 ENCOUNTER — HOSPITAL ENCOUNTER (OUTPATIENT)
Dept: OBSTETRICS AND GYNECOLOGY | Facility: HOSPITAL | Age: 39
Discharge: HOME | End: 2024-02-06
Attending: OBSTETRICS & GYNECOLOGY
Payer: MEDICAID

## 2024-02-06 ENCOUNTER — TELEPHONE (OUTPATIENT)
Dept: OBSTETRICS AND GYNECOLOGY | Facility: CLINIC | Age: 39
End: 2024-02-06
Payer: MEDICAID

## 2024-02-06 VITALS — DIASTOLIC BLOOD PRESSURE: 103 MMHG | SYSTOLIC BLOOD PRESSURE: 158 MMHG

## 2024-02-06 VITALS — OXYGEN SATURATION: 99 % | SYSTOLIC BLOOD PRESSURE: 135 MMHG | DIASTOLIC BLOOD PRESSURE: 83 MMHG | HEART RATE: 90 BPM

## 2024-02-06 DIAGNOSIS — R03.0 ELEVATED BLOOD PRESSURE READING: ICD-10-CM

## 2024-02-06 PROCEDURE — 25000003 PHARM REV CODE 250: Performed by: OBSTETRICS & GYNECOLOGY

## 2024-02-06 PROCEDURE — 99211 OFF/OP EST MAY X REQ PHY/QHP: CPT

## 2024-02-06 RX ORDER — NIFEDIPINE 30 MG/1
30 TABLET, EXTENDED RELEASE ORAL DAILY
Status: DISCONTINUED | OUTPATIENT
Start: 2024-02-06 | End: 2024-02-06 | Stop reason: HOSPADM

## 2024-02-06 RX ADMIN — NIFEDIPINE 30 MG: 30 TABLET, FILM COATED, EXTENDED RELEASE ORAL at 04:02

## 2024-02-06 NOTE — NURSING
Subsequent BPs elevated at 159/104, 158/106, & 158/103. Dr. Izquierdo notified and patient sent to L&D for BP monitoring.

## 2024-02-06 NOTE — NURSING
Patient arrived for 3 day post-discharge BP check. Initial /102. Dr. Izquierdo notified. MD states to repeat BP every 10 min x3 then call him with results.

## 2024-02-06 NOTE — NURSING
Notified Dr. Izquierdo of pt's BP's 145/88, 135/82, 136/81, and 135/ 83.  Ordered Procardia and may discharge home.  Keep follow up appointment.

## 2024-02-06 NOTE — TELEPHONE ENCOUNTER
----- Message from Radha Jones sent at 2/6/2024  8:24 AM CST -----  Type:  Post partum appt   Who Called:pt   Does the patient know what this is regarding?:pt would like to come in this week for her post partum appt  Pt prefers to see her provider only   Would the patient rather a call back or a response via [facility]? Call   Best Call Back Number:  [phone]  Additional Information:

## 2024-02-06 NOTE — NURSING
Pt arrived on unit from prenatal testing BP checks.  Pt denies headache blurred vision or seeing spots.  Pt has trace pedal edema and reflexes are +2.  Will take serial BP's

## 2024-02-06 NOTE — DISCHARGE INSTRUCTIONS
Keep follow up appointment on Friday for dressing removal and BP checks.  Return to L&D for severe headache, blurred vision or seeing spots.  Return for any severe BP's

## 2024-02-09 ENCOUNTER — POSTPARTUM VISIT (OUTPATIENT)
Dept: OBSTETRICS AND GYNECOLOGY | Facility: CLINIC | Age: 39
End: 2024-02-09
Payer: MEDICAID

## 2024-02-09 VITALS
DIASTOLIC BLOOD PRESSURE: 92 MMHG | WEIGHT: 269.63 LBS | SYSTOLIC BLOOD PRESSURE: 130 MMHG | BODY MASS INDEX: 43.52 KG/M2

## 2024-02-09 DIAGNOSIS — Z98.891 S/P C-SECTION: Primary | ICD-10-CM

## 2024-02-09 DIAGNOSIS — I10 CHRONIC HYPERTENSION: ICD-10-CM

## 2024-02-09 PROCEDURE — 99999 PR PBB SHADOW E&M-EST. PATIENT-LVL III: CPT | Mod: PBBFAC,,, | Performed by: OBSTETRICS & GYNECOLOGY

## 2024-02-09 PROCEDURE — 1111F DSCHRG MED/CURRENT MED MERGE: CPT | Mod: CPTII,,, | Performed by: OBSTETRICS & GYNECOLOGY

## 2024-02-09 PROCEDURE — 99213 OFFICE O/P EST LOW 20 MIN: CPT | Mod: PBBFAC,PO | Performed by: OBSTETRICS & GYNECOLOGY

## 2024-02-09 PROCEDURE — 99213 OFFICE O/P EST LOW 20 MIN: CPT | Mod: S$PBB,,, | Performed by: OBSTETRICS & GYNECOLOGY

## 2024-02-09 NOTE — PROGRESS NOTES
"CC: Post-partum follow-up    Joseph Duenas is a 38 y.o. female  presents for post-partum visit s/p a , due to CHTN w/ superimposed preeclampsia and previous  . Reports at home BP readings with systolics in the 110s and diastolics in the 70s. In office /92. Had B/\L tubal ligation.     Delivery Date: 2024  Delivery MD: Timbo  Gender: female    Birth Weight: 5 pounds 4.3 ounces  Breast Feeding: Yes (pumping)  Depression: NO  Contraception: bilateral tubal ligation    Pregnancy was complicated by:  None    Past Medical History:   Diagnosis Date    Hypertension      Past Surgical History:   Procedure Laterality Date     SECTION       SECTION WITH TUBAL LIGATION N/A 2024    Procedure:  SECTION, WITH TUBAL LIGATION;  Surgeon: Davis Izquierdo MD;  Location: [facility] L&D;  Service: OB/GYN;  Laterality: N/A;     Social History     Tobacco Use    Smoking status: Never    Smokeless tobacco: Never   Substance Use Topics    Alcohol use: Not Currently     Comment: occas    Drug use: No     Family History   Problem Relation Age of Onset    Hypertension Father     Arrhythmia Neg Hx     Cardiomyopathy Neg Hx     Early death Neg Hx     Heart attacks under age 50 Neg Hx     Pacemaker/defibrilator Neg Hx     Premature birth Neg Hx      OB History    Para Term  AB Living   5 3 2 1 2 3   SAB IAB Ectopic Multiple Live Births   1     0 3      # Outcome Date GA Lbr Paul/2nd Weight Sex Delivery Anes PTL Lv   5  24 34w1d  2.391 kg (5 lb 4.3 oz) F CS-LVertical Spinal  RODOLFO   4 Term 17 38w6d  3.816 kg (8 lb 6.6 oz) M CS-LTranv EPI N    3 AB 2015           2 SAB  12w0d          1 Term 07 39w0d  3.884 kg (8 lb 9 oz) M CS-LTranv  N RODOLFO      Birth Comments: per pt "toxemia at end of pregnancy"      Complications: Pre-eclampsia       Current Outpatient Medications:     ibuprofen (ADVIL,MOTRIN) 800 MG tablet, Take 1 tablet (800 mg " total) by mouth every 8 (eight) hours as needed for Pain., Disp: 30 tablet, Rfl: 0    NIFEdipine (PROCARDIA-XL) 30 MG (OSM) 24 hr tablet, Take 1 tablet (30 mg total) by mouth once daily., Disp: 30 tablet, Rfl: 11    acyclovir (ZOVIRAX) 800 MG Tab, Take 1 tablet (800 mg total) by mouth 5 (five) times daily. (Patient not taking: Reported on 2/9/2024), Disp: 40 tablet, Rfl: 0    fluticasone (FLONASE) 50 mcg/actuation nasal spray, 2 sprays (100 mcg total) by Each Nare route 2 (two) times daily as needed. (Patient not taking: Reported on 2/9/2024), Disp: 15 g, Rfl: 0    HYDROcodone-acetaminophen (NORCO) 5-325 mg per tablet, Take 1 tablet by mouth every 6 (six) hours as needed for Pain. (Patient not taking: Reported on 2/9/2024), Disp: 12 tablet, Rfl: 0     BP (!) 130/92 (BP Location: Left arm)   Wt 122.3 kg (269 lb 10 oz)   LMP 06/05/2023   BMI 43.52 kg/m²     ROS:  GENERAL: No fever, chills, fatigability or weight loss.  VULVAR: No pain, no lesions and no itching.  VAGINAL: No relaxation, no itching, no discharge, no abnormal bleeding and no lesions.  ABDOMEN: No abdominal pain. Denies nausea. Denies vomiting. No diarrhea. No constipation  BREAST: Denies pain. No lumps. No discharge.  URINARY: No incontinence, no nocturia, no frequency and no dysuria.  CARDIOVASCULAR: No chest pain. No shortness of breath. No leg cramps.  NEUROLOGICAL: No headaches. No vision changes.    PE:   APPEARANCE: Well nourished, well developed, in no acute distress.  NECK: Neck symmetric without  thyromegaly.  CHEST: Lungs clear to auscultation.  HEART: Regular rate and rhythm, no murmurs, rubs or gallops.  ABDOMEN: Soft. No tenderness or masses. No hernias. Incision healing well.   EXTREMITIES: No edema.    ASSESSMENT:  1. Postpartum Exam  2. CHTN    PLAN:  Adhesive bandage removed and incision is healing well  RTO 5 weeks  Continue taking procardia 30 mg daily and continue checking BP daily.     Patient seen and plan discussed with  attending physician Dr. Timbo Germain, DO  [facility], PGY-3  02/09/2024

## 2024-03-13 NOTE — OP NOTE
DATE OF PROCEDURE:  2017.    PREOPERATIVE DIAGNOSES:  1.  Intrauterine pregnancy at 38 and 6/7th weeks' gestation.  2.  Previous  section.  3.  Labor.  4.  Chronic hypertension.  5.  Obesity.    POSTOPERATIVE DIAGNOSES:  1.  Intrauterine pregnancy at 38 and 6/7th weeks' gestation.  2.  Previous  section.  3.  Labor.  4.  Chronic hypertension.  5.  Obesity.    PROCEDURE:  Repeat low transverse  section via Pfannenstiel incision.    SURGEON:  Davis Izquierdo M.D.    ASSISTANT:  Ms. Rubi Antnoio.    COMPLICATIONS:  None.    ESTIMATED BLOOD LOSS:  Approximately 800 mL.    ANESTHESIA:  Spinal.    PROCEDURE IN DETAIL:  After assuring informed consent, the patient was   transferred to the Operating Room where she underwent spinal anesthesia   placement which took approximately one hour from start to finish.  Following   final placement, the patient was placed in the supine position.  Botello catheter   was inserted under sterile conditions.  Abdomen was prepped and draped.    Following draping, assessment of anesthesia was noted to be adequate.  A   Pfannenstiel skin incision was made with scalpel and the prior  scar was   excised.  The incision was taken down the fascia with Bovie.  The fascia was   incised in midline and extended laterally with Bovie cautery.  Underlying rectus   muscles were dissected off.  The rectus was  in midline.  Peritoneum   was identified, tented upward and entered with Bovie cautery.  Peritoneal   incision was extended superiorly and inferiorly with good visualization of   bladder.  Bladder blade was inserted.  Transverse uterine incision was made with   scalpel.  Uterine incision was finger fractured in a cephalocaudad manner.    Amniotomy was performed with a hemostat and infant's head was delivered   atraumatically.  Nose and mouth were suctioned with suction bulb.  Rest of the   infant was delivered without difficulty and handed off to waiting  Her chest x-ray did not any acute findings.   pediatric   staff in vigorous condition.  Cord gases were sent.  Placenta was manually   extracted.  Uterus was exteriorized, wrapped in moist laparotomy sponge and   cleared of all clots and debris.  Uterine incision was repaired with a #1   running locked Vicryl suture started at each angle and tied in the midline.  The   right angle of the incision had a little bit of persistent bleeding and a single   figure-of-eight suture was placed to achieve good hemostasis.  Uterus was then   returned to the abdominal cavity.  Gutters were cleared of all clots and debris.    Uterine incision was visualized in situ and noted to be hemostatic.  Rectus   and peritoneum were closed together with a 2-0 running Vicryl stitch.  Rectus   irrigated and noted to be hemostatic.  Fascia was closed with #1 running Vicryl   suture started at each angle and tied in the midline.  The fascia was injected   with 0.25% Marcaine.  Subcutaneous tissues were generously irrigated and noted   to be hemostatic.  Subcutaneous tissue reapproximated with 2-0 Vicryl in a   running stitch.  The skin was closed with staples, injected again 0.25%   Marcaine.  A bandage was applied.  The patient tolerated the procedure well.    All counts were correct x2.  The patient was transferred to Recovery Room in   stable condition.  Pathological specimen includes placenta with a diagnosis of   chronic hypertension.      SUKHJINDER  dd: 06/04/2017 12:14:20 (CDT)  td: 06/04/2017 15:32:49 (CDT)  Doc ID   #[id number]  Job ID #[id number]    CC:

## 2024-03-20 ENCOUNTER — POSTPARTUM VISIT (OUTPATIENT)
Dept: OBSTETRICS AND GYNECOLOGY | Facility: CLINIC | Age: 39
End: 2024-03-20
Payer: MEDICAID

## 2024-03-20 VITALS
BODY MASS INDEX: 41.53 KG/M2 | SYSTOLIC BLOOD PRESSURE: 151 MMHG | DIASTOLIC BLOOD PRESSURE: 100 MMHG | WEIGHT: 257.25 LBS

## 2024-03-20 PROCEDURE — 99999 PR PBB SHADOW E&M-EST. PATIENT-LVL III: CPT | Mod: PBBFAC,,, | Performed by: OBSTETRICS & GYNECOLOGY

## 2024-03-20 PROCEDURE — 99213 OFFICE O/P EST LOW 20 MIN: CPT | Mod: PBBFAC,PN | Performed by: OBSTETRICS & GYNECOLOGY

## 2024-03-20 NOTE — PROGRESS NOTES
"CC: Post-partum follow-up    Joseph Duenas is a 38 y.o. female  presents for post-partum visit s/p a , due to prior . And BTL    Delivery Date: 2024  Delivery MD: Timbo  Gender: female   Name:   Birth Weight: 5 pounds 4 ounces  Breast Feeding: NO  Depression: NO  Contraception: bilateral tubal ligation    Pregnancy was complicated by:  None    Past Medical History:   Diagnosis Date    Hypertension      Past Surgical History:   Procedure Laterality Date     SECTION       SECTION WITH TUBAL LIGATION N/A 2024    Procedure:  SECTION, WITH TUBAL LIGATION;  Surgeon: Davis Izquierdo MD;  Location: [facility] L&D;  Service: OB/GYN;  Laterality: N/A;     Social History     Tobacco Use    Smoking status: Never    Smokeless tobacco: Never   Substance Use Topics    Alcohol use: Not Currently     Comment: occas    Drug use: No     Family History   Problem Relation Age of Onset    Hypertension Father     Arrhythmia Neg Hx     Cardiomyopathy Neg Hx     Early death Neg Hx     Heart attacks under age 50 Neg Hx     Pacemaker/defibrilator Neg Hx     Premature birth Neg Hx      OB History    Para Term  AB Living   5 3 2 1 2 3   SAB IAB Ectopic Multiple Live Births   1     0 3      # Outcome Date GA Lbr Paul/2nd Weight Sex Delivery Anes PTL Lv   5  24 34w1d  2.391 kg (5 lb 4.3 oz) F CS-LVertical Spinal  RODOLFO   4 Term 17 38w6d  3.816 kg (8 lb 6.6 oz) M CS-LTranv EPI N    3 AB            2 2013 12w0d          1 Term 07 39w0d  3.884 kg (8 lb 9 oz) M CS-LTranv  N RODOLFO      Birth Comments: per pt "toxemia at end of pregnancy"      Complications: Pre-eclampsia       Current Outpatient Medications:     ibuprofen (ADVIL,MOTRIN) 800 MG tablet, Take 1 tablet (800 mg total) by mouth every 8 (eight) hours as needed for Pain., Disp: 30 tablet, Rfl: 0    NIFEdipine (PROCARDIA-XL) 30 MG (OSM) 24 hr tablet, Take 1 tablet (30 mg total) by mouth once " daily., Disp: 30 tablet, Rfl: 11    acyclovir (ZOVIRAX) 800 MG Tab, Take 1 tablet (800 mg total) by mouth 5 (five) times daily. (Patient not taking: Reported on 2/9/2024), Disp: 40 tablet, Rfl: 0    fluticasone (FLONASE) 50 mcg/actuation nasal spray, 2 sprays (100 mcg total) by Each Nare route 2 (two) times daily as needed. (Patient not taking: Reported on 2/9/2024), Disp: 15 g, Rfl: 0    HYDROcodone-acetaminophen (NORCO) 5-325 mg per tablet, Take 1 tablet by mouth every 6 (six) hours as needed for Pain. (Patient not taking: Reported on 2/9/2024), Disp: 12 tablet, Rfl: 0     BP (!) 151/100   Wt 116.7 kg (257 lb 4.4 oz)   LMP 03/07/2024 (Exact Date)   BMI 41.53 kg/m²     ROS:  GENERAL: No fever, chills, fatigability or weight loss.  VULVAR: No pain, no lesions and no itching.  VAGINAL: No relaxation, no itching, no discharge, no abnormal bleeding and no lesions.  ABDOMEN: No abdominal pain. Denies nausea. Denies vomiting. No diarrhea. No constipation  BREAST: Denies pain. No lumps. No discharge.  URINARY: No incontinence, no nocturia, no frequency and no dysuria.  CARDIOVASCULAR: No chest pain. No shortness of breath. No leg cramps.  NEUROLOGICAL: No headaches. No vision changes.    PE:   APPEARANCE: Well nourished, well developed, in no acute distress.  NECK: Neck symmetric without  thyromegaly.  CHEST: Lungs clear to auscultation.  HEART: Regular rate and rhythm, no murmurs, rubs or gallops.  ABDOMEN: Soft. No tenderness or masses. No hernias.  BREASTS: Symmetrical, no skin changes or visible lesions. No palpable masses, nipple discharge or adenopathy bilaterally.  PELVIC: External female genitalia without lesions. Normal hair distribution. Adequate perineal body, normal urethral meatus. Vagina moist and well rugated without lesions or discharge. Cervix pink and without lesions. No significant cystocele or rectocele. Bimanual exam showed uterus normal size, shape, position, mobile and nontender. Adnexa without  masses or tenderness. Urethra and bladder normal.  EXTREMITIES: No edema.      ASSESSMENT:  1. Postpartum Exam    PLAN:  RTO  1 yr           Patient was counseled today on A.C.S. Pap guidelines and recommendations for yearly pelvic exams and monthly self breast exams; to see her PCP for other health maintenance and contraception options.

## 2024-10-25 NOTE — PROGRESS NOTES
Have you had Fatigue?  Yes    How lon Months  How bad: Moderate    2.   Have you had Chest Pain? No     3.   Have you had Dyspnea (SOB)? Yes, occasionally    How lon Months    can you walk 2 blocks or a flight of stairs without SOB? No    4.   Have you had Orthopnea? Yes  How lon Months , How bad:Mild    5.   Have you had PND? No     6.   Have you had any leg swelling? Yes   How long: Years How frequent: Daily How bad: Mild-Moderate    7.    Have you had any weight gain? No    8.    Have you had any palpitations? Yes   How long: Years  How frequent: Occasionally, upon exertion     9.    Have you had any syncope? No     10. Do you have any wounds on your legs? No    POSTPARTUM PROGRESS NOTE     Joseph Duenas is a 38 y.o. female POD #3 status post Repeat  section with tubal at 34w1d in a pregnancy complicated by chronic hypertension with super-imposed pre-eclampsia.     Patient is doing well this morning. She denies nausea, vomiting, fever or chills.  Patient reports mild abdominal pain that is well relieved by oral pain medications. Lochia is mild. Patient is voiding without difficulty and ambulating with no difficulty. She has passed flatus.    Infant is in the NICU.     Objective:       Temp:  [98 °F (36.7 °C)-98.2 °F (36.8 °C)] 98 °F (36.7 °C)  Pulse:  [] 94  Resp:  [16-18] 18  SpO2:  [99 %] 99 %  BP: (114-144)/(76-88) 114/78    General:   alert, appears stated age, and cooperative   Lungs:    Non-labored   Heart:   regular rate and rhythm   Abdomen:  soft, non-tender; bowel sounds normal; no masses,  no organomegaly   Uterus:  firm located at the umblicus.        Incision: Bandage in place, clean, dry and intact   Extremities: no pedal edema noted     Lab Review  No results found for this or any previous visit (from the past 4 hour(s)).    I/O  No intake or output data in the 24 hours ending 24 0719     Assessment:     Patient Active Problem List   Diagnosis    BMI 40.0-44.9, adult    Hypertension during pregnancy in second trimester    Fetal arrhythmia affecting pregnancy, antepartum    Chronic hypertension complicating or reason for care during pregnancy, third trimester    Hypertension affecting pregnancy in third trimester     delivery delivered        Plan:   1. Postpartum care:  - Patient doing well. Continue routine management and advances.  - Continue PO pain meds. Pain well controlled.    2. Chronic hypertension with superimposed pre-eclampsia  - BP in normal range  - will d/c home on Procardia 30XL    3. Chronic anemia  - continue oral iron        Dispo: Continue routine postpartum care. As patient meets milestones, will plan to  discharge POD# 4.    Meagan Ribeiro

## (undated) DEVICE — HEMOSTAT SURGICEL 4X8IN